# Patient Record
Sex: FEMALE | Race: WHITE | NOT HISPANIC OR LATINO | Employment: OTHER | URBAN - METROPOLITAN AREA
[De-identification: names, ages, dates, MRNs, and addresses within clinical notes are randomized per-mention and may not be internally consistent; named-entity substitution may affect disease eponyms.]

---

## 2017-01-31 ENCOUNTER — TELEPHONE (OUTPATIENT)
Dept: CARDIOTHORACIC SURGERY | Facility: CLINIC | Age: 72
End: 2017-01-31

## 2017-01-31 DIAGNOSIS — R91.1 PULMONARY NODULE: Primary | ICD-10-CM

## 2017-01-31 NOTE — TELEPHONE ENCOUNTER
Received a call from the pt's son requesting a referral to a thoracic surgeon at [facility] for this week. Pt's son had many questions in regards to coming to [facility] on Wednesday or Thursday as the pt will be dc'ed from rehab tomorrow morning. Pt broke her hip and it was surgically fixed at [facility] and then she was sent to rehab for PT/OT. 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 Pt met heme/onc Dr. Palmer after the diagnosis and she did not feel comfortable moving forward with care with Dr. Palmer.   A PET was performed yesterday and was negative for metastatic disease. Discussed the referral process to pt's son and he is agreeable to bring his mom to [facility] for an appt. Explained that we would obtain PFT's prior to consultation with the surgeon. Pt's son insisted the pt be seen in the next two days. Only availability on Thursday 2/2 is at 8a. Pt's son is agreeable to this date/time and will have the pt here for 730a to have PFT's performed. I will email him the confirmed appts once records are received tomorrow. He will go to medical records to have her records faxed to me tomorrow; provided him with my contact information and fax #. He will also  imaging from radiology to ensure he has the CT chest and PET on CD.

## 2017-02-02 ENCOUNTER — HOSPITAL ENCOUNTER (OUTPATIENT)
Dept: PULMONOLOGY | Facility: CLINIC | Age: 72
Discharge: HOME | End: 2017-02-02
Payer: MEDICARE

## 2017-02-02 ENCOUNTER — OFFICE VISIT (OUTPATIENT)
Dept: CARDIOTHORACIC SURGERY | Facility: CLINIC | Age: 72
End: 2017-02-02
Payer: MEDICARE

## 2017-02-02 VITALS
HEIGHT: 63 IN | WEIGHT: 128 LBS | BODY MASS INDEX: 22.68 KG/M2 | OXYGEN SATURATION: 97 % | SYSTOLIC BLOOD PRESSURE: 97 MMHG | DIASTOLIC BLOOD PRESSURE: 67 MMHG | HEART RATE: 95 BPM

## 2017-02-02 DIAGNOSIS — R91.1 PULMONARY NODULE: ICD-10-CM

## 2017-02-02 DIAGNOSIS — J43.2 CENTRILOBULAR EMPHYSEMA: ICD-10-CM

## 2017-02-02 DIAGNOSIS — C34.11 MALIGNANT NEOPLASM OF UPPER LOBE OF RIGHT LUNG: Primary | ICD-10-CM

## 2017-02-02 LAB
POST FEV1 FVC: 0.62
POST FEV1: 1.39
POST FVC: 2.24
PRE FEV1 FVC: 60
PRE FEV1: 1.35
PRE FVC: 2.25
PREDICTED FEV1 FVC: 78
PREDICTED FEV1: 2.1
PREDICTED FVC: 2.71

## 2017-02-02 PROCEDURE — 94727 GAS DIL/WSHOT DETER LNG VOL: CPT | Mod: 26,S$PBB,, | Performed by: INTERNAL MEDICINE

## 2017-02-02 PROCEDURE — 99999 PR PBB SHADOW E&M-EST. PATIENT-LVL III: CPT | Mod: PBBFAC,,, | Performed by: THORACIC SURGERY (CARDIOTHORACIC VASCULAR SURGERY)

## 2017-02-02 PROCEDURE — 94060 EVALUATION OF WHEEZING: CPT | Mod: 26,S$PBB,, | Performed by: INTERNAL MEDICINE

## 2017-02-02 PROCEDURE — 99205 OFFICE O/P NEW HI 60 MIN: CPT | Mod: S$PBB,,, | Performed by: THORACIC SURGERY (CARDIOTHORACIC VASCULAR SURGERY)

## 2017-02-02 PROCEDURE — 99213 OFFICE O/P EST LOW 20 MIN: CPT | Mod: PBBFAC | Performed by: THORACIC SURGERY (CARDIOTHORACIC VASCULAR SURGERY)

## 2017-02-02 PROCEDURE — 94729 DIFFUSING CAPACITY: CPT | Mod: 26,S$PBB,, | Performed by: INTERNAL MEDICINE

## 2017-02-02 RX ORDER — HYDROCODONE BITARTRATE AND ACETAMINOPHEN 5; 325 MG/1; MG/1
1 TABLET ORAL EVERY 6 HOURS PRN
COMMUNITY
End: 2017-03-27 | Stop reason: SDUPTHER

## 2017-02-02 RX ORDER — TRAZODONE HYDROCHLORIDE 50 MG/1
50 TABLET ORAL NIGHTLY
COMMUNITY

## 2017-02-02 RX ORDER — ZOLPIDEM TARTRATE 10 MG/1
TABLET ORAL
Refills: 5 | COMMUNITY
Start: 2016-12-17 | End: 2017-03-02

## 2017-02-02 RX ORDER — ERGOCALCIFEROL 1.25 MG/1
50000 CAPSULE ORAL
COMMUNITY

## 2017-02-02 RX ORDER — TOFACITINIB 5 MG/1
TABLET, FILM COATED ORAL
Refills: 4 | COMMUNITY
Start: 2017-01-26 | End: 2017-03-02

## 2017-02-02 RX ORDER — CALCIUM CARBONATE 600 MG
600 TABLET ORAL 2 TIMES DAILY WITH MEALS
COMMUNITY

## 2017-02-02 NOTE — LETTER
February 2, 2017      Dionna Mcgrath MD  [address]  [place] [address]           [place] - Thoracic Surgery  [address]  [place] [address]  Phone: [phone]  Fax: [phone]          Patient: Christi Ridley   MR Number: [id number]   YOB: 1945   Date of Visit: 2/2/2017       Dear Dr. Dionna Mcgrath:    Thank you for referring Christi Ridley to me for evaluation. Attached you will find relevant portions of my assessment and plan of care.    If you have questions, please do not hesitate to call me. I look forward to following Christi Ridley along with you.    Sincerely,    Cristobal Culp MD    Enclosure  CC:  No Recipients    If you would like to receive this communication electronically, please contact [email or web address] or [phone] to request more information on [email or web address] Link access.    For providers and/or their staff who would like to refer a patient to [facility], please contact us through our one-stop-shop provider referral line, [facility], at [phone].    If you feel you have received this communication in error or would no longer like to receive these types of communications, please e-mail [email or web address]

## 2017-02-03 ENCOUNTER — TELEPHONE (OUTPATIENT)
Dept: CARDIOTHORACIC SURGERY | Facility: CLINIC | Age: 72
End: 2017-02-03

## 2017-02-03 NOTE — TELEPHONE ENCOUNTER
Called and spoke with son Arden concerning the last time a colonoscopy was performed on Mrs. Ridley.  Report no previous colonoscopy has been performed, requesting a colonoscopy be requested with her primary care physician on Tuesday 2/7.  Informed him also that the PET scan showed an abnormality in her colon.  Asked to have results forwarded to Dr. Culp's office.  Confirmed correct fax information to send reports.

## 2017-02-13 ENCOUNTER — TELEPHONE (OUTPATIENT)
Dept: CARDIOTHORACIC SURGERY | Facility: CLINIC | Age: 72
End: 2017-02-13

## 2017-02-13 NOTE — TELEPHONE ENCOUNTER
Received call from son Arden, informed that the colonoscopy has been performed and asked that results be sent to Dr. Culp's office.  Offered fax number again to send records to.

## 2017-02-20 ENCOUNTER — TELEPHONE (OUTPATIENT)
Dept: CARDIOTHORACIC SURGERY | Facility: CLINIC | Age: 72
End: 2017-02-20

## 2017-02-20 NOTE — TELEPHONE ENCOUNTER
----- Message from Cristobal Culp MD sent at 2/17/2017  7:35 PM CST -----  She can come in whenever she wants to reassess and discuss    ----- Message -----     From: Yumiko Yan RN     Sent: 2/17/2017   5:32 PM       To: Cristobal Culp MD    Mrs. Ridley Cards clearance and testing is scanned into media.  Son Arden would like to know where we are with scheduling her for surgery.  I will touch base with her on Monday to see about the rehab portion of her recovery.    Thanks  Yumiko

## 2017-03-02 ENCOUNTER — HOSPITAL ENCOUNTER (OUTPATIENT)
Dept: PREADMISSION TESTING | Facility: HOSPITAL | Age: 72
Discharge: HOME | End: 2017-03-02
Attending: ANESTHESIOLOGY
Payer: MEDICARE

## 2017-03-02 ENCOUNTER — ANESTHESIA EVENT (OUTPATIENT)
Dept: SURGERY | Facility: HOSPITAL | Age: 72
DRG: 167 | End: 2017-03-02
Payer: MEDICARE

## 2017-03-02 ENCOUNTER — OFFICE VISIT (OUTPATIENT)
Dept: CARDIOTHORACIC SURGERY | Facility: CLINIC | Age: 72
End: 2017-03-02
Payer: MEDICARE

## 2017-03-02 VITALS
HEIGHT: 63 IN | TEMPERATURE: 98 F | BODY MASS INDEX: 22.32 KG/M2 | DIASTOLIC BLOOD PRESSURE: 68 MMHG | OXYGEN SATURATION: 95 % | HEART RATE: 85 BPM | SYSTOLIC BLOOD PRESSURE: 109 MMHG | WEIGHT: 126 LBS | RESPIRATION RATE: 16 BRPM

## 2017-03-02 VITALS
BODY MASS INDEX: 21.61 KG/M2 | DIASTOLIC BLOOD PRESSURE: 68 MMHG | SYSTOLIC BLOOD PRESSURE: 105 MMHG | HEART RATE: 81 BPM | WEIGHT: 121.94 LBS | HEIGHT: 63 IN

## 2017-03-02 DIAGNOSIS — M06.9 RHEUMATOID ARTHRITIS, INVOLVING UNSPECIFIED SITE, UNSPECIFIED RHEUMATOID FACTOR PRESENCE: ICD-10-CM

## 2017-03-02 DIAGNOSIS — C34.11 MALIGNANT NEOPLASM OF RIGHT UPPER LOBE OF LUNG: Primary | ICD-10-CM

## 2017-03-02 PROCEDURE — 99214 OFFICE O/P EST MOD 30 MIN: CPT | Mod: S$PBB,,, | Performed by: THORACIC SURGERY (CARDIOTHORACIC VASCULAR SURGERY)

## 2017-03-02 PROCEDURE — 99999 PR PBB SHADOW E&M-EST. PATIENT-LVL IV: CPT | Mod: PBBFAC,,, | Performed by: THORACIC SURGERY (CARDIOTHORACIC VASCULAR SURGERY)

## 2017-03-02 RX ORDER — ALPRAZOLAM 0.5 MG/1
TABLET ORAL
Refills: 3 | COMMUNITY
Start: 2017-02-19

## 2017-03-02 NOTE — IP AVS SNAPSHOT
[facility]  [address]  [facility] [address]  Phone: [phone]           Patient Discharge Instructions    Our goal is to set you up for success. This packet includes information on your condition, medications, and your home care. It will help you to care for yourself so you don't get sicker.     Please ask your nurse if you have any questions.        There are many details to remember when preparing for your surgery. Here is what you will need to do, please ask your nurse if there are more specific instructions and if you have any questions:    1. 24 hours before procedure Do not smoke or drink alcoholic beverages 24 hours prior to your procedure    2. Eating before procedure Do not eat or drink anything 8 hours before your procedure - this includes gum, mints, and candy.     3. Day of procedure Please remove all jewelry for the procedure. If you wear contact lenses, dentures, hearing aids or glasses, bring a container to put them in during your surgery and give to a family member for safekeeping.  If your doctor has scheduled you for an overnight stay, bring a small overnight bag with any personal items that you need.    4. After procedure Make arrangements in advance for transportation home by a responsible adult. It is not safe to drive a vehicle during the 24 hours following surgery.     PLEASE NOTE: You may be contacted the day before your surgery to confirm your surgery date and arrival time. The Surgery schedule has many variables which may affect the time of your surgery case. Family members should be available if your surgery time changes.                [facility]  Unless otherwise directed by your provider, please contact [facility], our nurse care line that is available for 24/7 assistance.     [phone] (toll-free)    Registered nurses in the [facility] provide clinical advisement, health education, appointment booking, and other  advisory services.                    ** Verify the list of medication(s) below is accurate and up to date. Carry this with you in case of emergency. If your medications have changed, please notify your healthcare provider.             Medication List      TAKE these medications        Additional Info                      alprazolam 0.5 MG tablet   Commonly known as:  XANAX   Refills:  3    Instructions:  TK 1 T PO QID PRA     Begin Date    AM    Noon    PM    Bedtime       calcium carbonate 600 mg (1,500 mg) Tab   Commonly known as:  OS-JACKI   Refills:  0   Dose:  600 mg    Instructions:  Take 600 mg by mouth 2 (two) times daily with meals.     Begin Date    AM    Noon    PM    Bedtime       ergocalciferol 50,000 unit Cap   Commonly known as:  ERGOCALCIFEROL   Refills:  0   Dose:  16647 Units    Instructions:  Take 50,000 Units by mouth every 7 days.     Begin Date    AM    Noon    PM    Bedtime       hydrocodone-acetaminophen 5-325mg 5-325 mg per tablet   Commonly known as:  NORCO   Refills:  0   Dose:  1 tablet    Instructions:  Take 1 tablet by mouth every 6 (six) hours as needed for Pain.     Begin Date    AM    Noon    PM    Bedtime       trazodone 50 MG tablet   Commonly known as:  DESYREL   Refills:  0   Dose:  50 mg    Instructions:  Take 50 mg by mouth every evening.     Begin Date    AM    Noon    PM    Bedtime                  Please bring to all follow up appointments:    1. A copy of your discharge instructions.  2. All medicines you are currently taking in their original bottles.  3. Identification and insurance card.    Please arrive 15 minutes ahead of scheduled appointment time.    Please call 24 hours in advance if you must reschedule your appointment and/or time.        Your Scheduled Appointments     Mar 07, 2017 10:30 AM CST   CT CHEST W/OUT CONTRAST with [facility] CT2 LIMIT 500 LBS   [facility] [facility] [address] [place] [address] [phone]             Mar 07, 2017 11:00 AM CST   Spirometry Pre/Post with [facility] PULMONARY   [facility] [address]  [place] [address] [phone]              Your Future Surgeries/Procedures     Mar 08, 2017   Surgery with Cristobal Culp MD   [facility]    [address] [facility] [address] [phone]                  Discharge Instructions       Your surgery has been scheduled for:__________________________________________    You should report to:  ____[facility], located on the [place] side of the first floor of the           [facility] ([phone])  ____[facility], located on the [facility] side of the            Second floor of the [facility] ([phone])  ____3rd Floor [facility] located on the [facility] side of the [facility] [phone]  Please Note   - Tell your doctor if you take Aspirin, products containing Aspirin, herbal medications  or blood thinners, such as Coumadin, Ticlid, or Plavix.  (Consult your provider regarding holding or stopping before surgery).  - Arrange for someone to drive you home following surgery.  You will not be allowed to leave the surgical facility alone or drive yourself home following sedation and anesthesia.  Before Surgery  - Stop taking all herbal medications 14days prior to surgery  - No Motrin/Advil (Ibuprofen) 7 days before surgery  - No Aleve (Naproxen) 7 days before surgery  - Stop Taking Asprin, products containing Asprin _____days before surgery  - Stop taking blood thinners_______days before surgery  - Refrain from drinking alcoholic beverages for 24hours before and after surgery  - Stop or limit smoking _________days before surgery  Night before Surgery  - DO NOT EAT OR DRINK ANYTHING AFTER MIDNIGHT, INCLUDING GUM, HARD CANDY, MINTS, OR CHEWING  TOBACCO.  - Take a shower or bath (shower is recommended).  Bathe with Hibiclens soap or an antibacterial soap from the neck down.  If not supplied by your surgeon, hibiclens soap will need to be purchased over the counter in pharmacy.  Rinse soap off thoroughly.  - Shampoo your hair with your regular shampoo  The Day of Surgery  - Take another bath or shower with hibiclens or any antibacterial soap, to reduce the chance of infection.  - Take heart and blood pressure medications with a small sip of water, as advised by the perioperative team.  - Do not take fluid pills  - You may brush your teeth and rinse your mouth, but do not swall any additional water.   - Do not apply perfumes, powder, body lotions or deodorant on the day of surgery.  - Nail polish should be removed.  - Do not wear makeup or moisturizer  - Wear comfortable clothes, such as a button front shirt and loose fitting pants.  - Leave all jewelry, including body piercings, and valuables at home.    - Bring any devices you will neeed after surgery such as crutches or canes.  - If you have sleep apnea, please bring your CPAP machine  In the event that your physical condition changes including the onset of a cold or respiratory illness, or if you have to delay or cancel your surgery, please notify your surgeon.Anesthesia: General Anesthesia  Youre due to have surgery. During surgery, youll be given medication called anesthesia. (It is also called anesthetic.) This will keep you comfortable and pain-free. Your surgeon will use general anesthesia. This sheet tells you more about it.    What Is General Anesthesia?  General anesthesia puts you into a state like deep sleep. It goes into the bloodstream (IV anesthetics), into the lungs (gas anesthetics), or both. You feel nothing during the procedure. You will not remember it. During the procedure, the anesthesia provider monitors you. He or she checks your heart rate and rhythm, blood pressure, and blood  oxygen.  · IV Anesthetics  IV anesthetics are given through an IV line in your arm. Theyre often given first. This is so you are asleep before a gas anesthetic is started. Some kinds of IV anesthetics relieve pain. Others relax you. Your doctor will decide which kind is best in your case.  · Gas Anesthetics  Gas anesthetics are breathed into the lungs. They are often used to keep you asleep. They can be given through a facemask, an endotracheal tube, or a laryngeal mask airway.  ¨ If you have a facemask, your anesthesia provider will most likely place it over your nose and mouth while youre still awake. Youll breathe oxygen through the mask as your IV anesthetic is started. Gas anesthetic may be added through the mask.  ¨ If you have an endotracheal tube or a laryngeal mask airway, it will be inserted into your throat after youre asleep.  Anesthesia Tools and Medications  You will likely have:  · IV anesthetics sent through an IV line into your bloodstream.  · Gas anesthetics breathed into your lungs, where they pass into your bloodstream.  · A pulse oximeter on the end of your finger. This measures your blood oxygen level.  · Electrocardiography leads (electrodes) on your chest. These record your heart rate and rhythm.  · A blood pressure cuff. This reads your blood pressure.  Risks and Possible Complications  General anesthesia has some risks. These include:  · Breathing problems  · Nausea and vomiting  · Sore throat or hoarseness  · Allergic reaction to the anesthetic  · Ongoing numbness (rare)  · Irregular heartbeat (rare)  · Cardiac arrest (rare)   Anesthesia Safety  · Follow all instructions you are given for how long not to eat or drink before your procedure.  · Be sure your doctor knows what medications you take. This includes over-the-counter medications, herbs, and supplements.  · Have an adult family member or friend drive you home after the procedure.  · For the first 24 hours after your  surgery:  ¨ Do not drive or use heavy equipment.  ¨ Do not make important decisions or sign documents.  ¨ Avoid alcohol.  ¨ Have someone stay with you, if possible. They can watch for problems and help keep you safe.  © 5898-1833 [facility] [facility], [address] [place] [address], [place] [address]. All rights reserved. This information is not intended as a substitute for professional medical care. Always follow your healthcare professional's instructions.    Discharge References/Attachments     PAIN AT HOME, MANAGING POST-OP: NON-MEDICATION RELIEF (ENGLISH)    PAIN MANAGEMENT AFTER SURGERY (ENGLISH)        Admission Information     Date & Time Provider Department CSN    3/2/2017  1:00 PM Rhonda G Leopold, MD [facility] [id number]      Care Providers     Provider Role Specialty Primary office phone    Rhonda G Leopold, MD Attending Provider Anesthesiology [phone]      Your Vitals Were     BP                   109/68           Recent Lab Values     No lab values to display.      Allergies as of 3/2/2017     No Known Allergies      Advance Directives     An advance directive is a document which, in the event you are no longer able to make decisions for yourself, tells your healthcare team what kind of treatment you do or do not want to receive, or who you would like to make those decisions for you.  If you do not currently have an advance directive, [facility] encourages you to create one.  For more information call:  [phone]), [phone] ([phone]),  or log on to [email or web address].        Smoking Cessation     If you would like to quit smoking:   You may be eligible for free services if you are a [place] resident and started smoking cigarettes before September 1, 1988.  Call the Smoking Cessation Trust (SCT) toll free at (655) 965-9517 or (057) 976-9519.   Call 0-800-QUIT-NOW if you do not meet the above criteria.            Language Assistance Services     ATTENTION:  Language assistance services are available, free of charge. Please call [phone].      ATENCIÓN: Si isra escalera, tiene a azul disposición servicios gratuitos de asistencia lingüística. Llame al [phone].     CHÚ Ý: N?u b?n nói Ti?ng Vi?t, có các d?ch v? h? tr? ngôn ng? mi?n phí dành cho b?n. G?i s? [phone].        [facility] Sign-Up     Activating your [facility] account is as easy as 1-2-3!     1) Visit [email or web address], select Sign Up Now, enter this activation code and your date of birth, then select Next.  [email or web address]  Expires: 4/16/2017  1:39 PM      2) Create a username and password to use when you visit [facility] in the future and select a security question in case you lose your password and select Next.    3) Enter your e-mail address and click Sign Up!    Additional Information  If you have questions, please e-mail [email or web address] or call [phone] to talk to our [facility] staff. Remember, [facility] is NOT to be used for urgent needs. For medical emergencies, dial 911.          [facility] complies with applicable Federal civil rights laws and does not discriminate on the basis of race, color, national origin, age, disability, or sex.

## 2017-03-02 NOTE — PROGRESS NOTES
History & Physical    SUBJECTIVE:     History of Present Illness:   71 y.o. female presents with PMH of COPD, HTN, HLD, and RA presents with right apical adenocarcinoma. At last visit, patient was recently admitted at an OSH following a fall that resulting in a right femur fracture fracture. During her evaluation, she was found to have a right apical nodule which was found to be adenocarcinoma following percutaneous biopsy. Returns today after 1 month of outpatient rehab. Today she reports improved mobility with rehab. Uses a walker at home. Reports upper and lower extremity pain and swelling due to being off all RA medications. Denies fever, chills, SOB, wheeze, changes in appetite or bowel/bladder functioning.     Current smoker. Smokes 2-4 cigarettes/week. 50 pack year history.     Chief Complaint   Patient presents with    Follow-up       Review of patient's allergies indicates:  No Known Allergies    Current Outpatient Prescriptions   Medication Sig Dispense Refill    alprazolam (XANAX) 0.5 MG tablet TK 1 T PO QID PRA  3    calcium carbonate (OS-JACKI) 600 mg (1,500 mg) Tab Take 600 mg by mouth 2 (two) times daily with meals.      docusate sodium (COLACE) 50 MG capsule Take by mouth 2 (two) times daily.      ENBREL SURECLICK 50 mg/mL (0.98 mL) PnIj INJECT 50MG SUBCUTANEOUSLY ONCE WEEKLY 3.92 mL 3    ergocalciferol (ERGOCALCIFEROL) 50,000 unit Cap Take 50,000 Units by mouth every 7 days.      hydrocodone-acetaminophen 5-325mg (NORCO) 5-325 mg per tablet Take 1 tablet by mouth every 6 (six) hours as needed for Pain.      trazodone (DESYREL) 50 MG tablet Take 50 mg by mouth every evening.      XELJANZ 5 mg Tab TK 1 T PO BID  4    zolpidem (AMBIEN) 10 mg Tab TK 1 T PO QHS PRF SLEEP  5     No current facility-administered medications for this visit.        No past medical history on file.  No past surgical history on file.  No family history on file.  Social History   Substance Use Topics    Smoking status:  "Not on file    Smokeless tobacco: Not on file    Alcohol use Not on file        Review of Systems:  Review of Systems   Constitutional: Negative for activity change, appetite change, diaphoresis and fatigue.   HENT: Negative.    Eyes: Negative.    Respiratory: Negative for cough, chest tightness, shortness of breath and wheezing.    Cardiovascular: Positive for leg swelling. Negative for chest pain and palpitations.   Endocrine: Negative.    Genitourinary: Negative.    Musculoskeletal: Positive for arthralgias, joint swelling and myalgias.   Skin: Negative.    Allergic/Immunologic: Negative.    Neurological: Positive for weakness. Negative for tremors and syncope.   Psychiatric/Behavioral: Negative.        OBJECTIVE:     Vital Signs (Most Recent)  Pulse: 81 (03/02/17 1052)  BP: 105/68 (03/02/17 1052)  5' 3" (1.6 m)  55.3 kg (121 lb 14.6 oz)     Physical Exam:  Physical Exam   Constitutional: She is oriented to person, place, and time. She appears well-developed and well-nourished. No distress.   HENT:   Head: Normocephalic and atraumatic.   Mouth/Throat: Oropharynx is clear and moist.   Eyes: EOM are normal. Pupils are equal, round, and reactive to light.   Neck: Normal range of motion. Neck supple.   Cardiovascular: Normal rate, regular rhythm and normal heart sounds.  Exam reveals decreased pulses.    Pulses:       Radial pulses are 2+ on the right side, and 2+ on the left side.        Dorsalis pedis pulses are 1+ on the right side, and 1+ on the left side.        Posterior tibial pulses are 1+ on the right side, and 1+ on the left side.   Pulmonary/Chest: Effort normal. No respiratory distress. She has decreased breath sounds in the right lower field and the left lower field. She has no wheezes. She has no rhonchi.   Abdominal: Soft. Bowel sounds are normal. She exhibits no distension. There is no tenderness.   Musculoskeletal: Normal range of motion.        Right wrist: She exhibits tenderness and swelling.    "     Right ankle: She exhibits swelling.        Left ankle: She exhibits swelling.   Lymphadenopathy:     She has no cervical adenopathy.   Neurological: She is alert and oriented to person, place, and time.   Skin: Skin is warm and dry. She is not diaphoretic. No erythema. No pallor.   Psychiatric: She has a normal mood and affect.   Vitals reviewed.    Diagnostic Results:    PFTS:   FEV1- 1.35L 64%  DLCO- 9 52%    Outside Chest CT, PET and Stress ECHO: Reviewed.     ASSESSMENT/PLAN:     71 y.o. female presents with PMH of COPD, HTN, HLD, and RA presents with right apical adenocarcinoma.    PLAN:Plan     - Repeat chest CT and PFTS prior to surgery   - Proceed with right VATS for right upper lobectomy and MLND, possible thoracotomy   Appropriate patient education regarding the bran-operative period as well as intraperative details were discussed. Risks, including but not limited to, bleeding, infection, pain and anesthetic complication were discussed. Patient was given the opportunity to ask questions and to have those questions answered to their satisfaction. Patient verbalized understanding to both procedure and associated risks. Consent was obtained.    ATTENDING ATTESTATION:    I evaluated the patient and I agree with the assessment and plan  Doing well.  Will repeat chest CT and PFTs prior to resection, which is tentatively planned for 3/22/2017.  Long discussion with patient and her  about the indication and rationale for right upper lobectomy, the details of the operation, as well as its risks, benefits and potential outcomes.  We also discussed the alternative treatments, including SBRT, and the risks of no treatment.  They were given the opportunity to ask questions and I answered all of their questions to their satisfaction.  They demonstrated understanding and appreciation of above info.  She has decided to proceed with Right Thoracoscopic Upper Lobectomy and Mediastinal Lymph Node Dissection,  Possible Thoracotomy on 3/22/2017.

## 2017-03-02 NOTE — DISCHARGE INSTRUCTIONS
Your surgery has been scheduled for:__________________________________________    You should report to:  ____[facility], located on the [place] side of the first floor of the           [facility] ([phone])  ____[facility], located on the [facility] side of the            Second floor of the [facility] ([phone])  ____3rd Floor [facility] located on the [facility] side of the [facility] [phone]  Please Note   - Tell your doctor if you take Aspirin, products containing Aspirin, herbal medications  or blood thinners, such as Coumadin, Ticlid, or Plavix.  (Consult your provider regarding holding or stopping before surgery).  - Arrange for someone to drive you home following surgery.  You will not be allowed to leave the surgical facility alone or drive yourself home following sedation and anesthesia.  Before Surgery  - Stop taking all herbal medications 14days prior to surgery  - No Motrin/Advil (Ibuprofen) 7 days before surgery  - No Aleve (Naproxen) 7 days before surgery  - Stop Taking Asprin, products containing Asprin _____days before surgery  - Stop taking blood thinners_______days before surgery  - Refrain from drinking alcoholic beverages for 24hours before and after surgery  - Stop or limit smoking _________days before surgery  Night before Surgery  - DO NOT EAT OR DRINK ANYTHING AFTER MIDNIGHT, INCLUDING GUM, HARD CANDY, MINTS, OR CHEWING TOBACCO.  - Take a shower or bath (shower is recommended).  Bathe with Hibiclens soap or an antibacterial soap from the neck down.  If not supplied by your surgeon, hibiclens soap will need to be purchased over the counter in pharmacy.  Rinse soap off thoroughly.  - Shampoo your hair with your regular shampoo  The Day of Surgery  - Take another bath or shower with hibiclens or any antibacterial soap, to reduce the chance of infection.  - Take heart and blood  pressure medications with a small sip of water, as advised by the perioperative team.  - Do not take fluid pills  - You may brush your teeth and rinse your mouth, but do not swall any additional water.   - Do not apply perfumes, powder, body lotions or deodorant on the day of surgery.  - Nail polish should be removed.  - Do not wear makeup or moisturizer  - Wear comfortable clothes, such as a button front shirt and loose fitting pants.  - Leave all jewelry, including body piercings, and valuables at home.    - Bring any devices you will neeed after surgery such as crutches or canes.  - If you have sleep apnea, please bring your CPAP machine  In the event that your physical condition changes including the onset of a cold or respiratory illness, or if you have to delay or cancel your surgery, please notify your surgeon.Anesthesia: General Anesthesia  Youre due to have surgery. During surgery, youll be given medication called anesthesia. (It is also called anesthetic.) This will keep you comfortable and pain-free. Your surgeon will use general anesthesia. This sheet tells you more about it.    What Is General Anesthesia?  General anesthesia puts you into a state like deep sleep. It goes into the bloodstream (IV anesthetics), into the lungs (gas anesthetics), or both. You feel nothing during the procedure. You will not remember it. During the procedure, the anesthesia provider monitors you. He or she checks your heart rate and rhythm, blood pressure, and blood oxygen.  · IV Anesthetics  IV anesthetics are given through an IV line in your arm. Theyre often given first. This is so you are asleep before a gas anesthetic is started. Some kinds of IV anesthetics relieve pain. Others relax you. Your doctor will decide which kind is best in your case.  · Gas Anesthetics  Gas anesthetics are breathed into the lungs. They are often used to keep you asleep. They can be given through a facemask, an endotracheal tube, or a  laryngeal mask airway.  ¨ If you have a facemask, your anesthesia provider will most likely place it over your nose and mouth while youre still awake. Youll breathe oxygen through the mask as your IV anesthetic is started. Gas anesthetic may be added through the mask.  ¨ If you have an endotracheal tube or a laryngeal mask airway, it will be inserted into your throat after youre asleep.  Anesthesia Tools and Medications  You will likely have:  · IV anesthetics sent through an IV line into your bloodstream.  · Gas anesthetics breathed into your lungs, where they pass into your bloodstream.  · A pulse oximeter on the end of your finger. This measures your blood oxygen level.  · Electrocardiography leads (electrodes) on your chest. These record your heart rate and rhythm.  · A blood pressure cuff. This reads your blood pressure.  Risks and Possible Complications  General anesthesia has some risks. These include:  · Breathing problems  · Nausea and vomiting  · Sore throat or hoarseness  · Allergic reaction to the anesthetic  · Ongoing numbness (rare)  · Irregular heartbeat (rare)  · Cardiac arrest (rare)   Anesthesia Safety  · Follow all instructions you are given for how long not to eat or drink before your procedure.  · Be sure your doctor knows what medications you take. This includes over-the-counter medications, herbs, and supplements.  · Have an adult family member or friend drive you home after the procedure.  · For the first 24 hours after your surgery:  ¨ Do not drive or use heavy equipment.  ¨ Do not make important decisions or sign documents.  ¨ Avoid alcohol.  ¨ Have someone stay with you, if possible. They can watch for problems and help keep you safe.  © 9106-3846 [employer], [address] [place] [address], [place] [address]. All rights reserved. This information is not intended as a substitute for professional medical care. Always follow your healthcare professional's instructions.

## 2017-03-02 NOTE — ANESTHESIA PREPROCEDURE EVALUATION
03/02/2017  Pre-operative evaluation for Procedure(s) (LRB):  THORACOSCOPY-VIDEO ASSISTED (VATS) (Right)  DISSECTION-LYMPH NODE (Right)    Christi Ridley is a 71 y.o. female with PMH of COPD, HTN, HLD, tobacco abuse (50 pack year history; has weaned herself down to 0-1 cigarette per day) and RA presents to preop clinic for evaluation prior to above procedure. She is currently wheel-chair bound due to recovery from right femur fracture in January. She has improved mobility with rehab. She denies any history of CAD and denies any active chest pain or SOB today. She received preop EKG and ECHO on 2/10 with Cardiology and was cleared for surgery. Last H/H on 1/31/17 8.8/26.4 down from 3/29/12 H/H of 16/47.8. Ordered CBC, BMP, and Type & Screen.   Lab Results   Component Value Date    WBC 5.34 03/02/2017    HGB 9.6 (L) 03/02/2017    HCT 29.8 (L) 03/02/2017    MCV 99 (H) 03/02/2017     (H) 03/02/2017       Chemistry        Component Value Date/Time     (L) 03/02/2017 1410    K 3.7 03/02/2017 1410     03/02/2017 1410    CO2 24 03/02/2017 1410    BUN 14 03/02/2017 1410    CREATININE 0.8 03/02/2017 1410     (H) 03/02/2017 1410        Component Value Date/Time    CALCIUM 9.0 03/02/2017 1410    ALKPHOS 113 03/29/2012 1040    AST 54 (H) 03/29/2012 1040    ALT 38 03/29/2012 1040    BILITOT 0.7 03/29/2012 1040            Prev airway: None on file      Patient Active Problem List   Diagnosis    Malignant neoplasm of upper lobe of right lung    Centrilobular emphysema    Rheumatoid arthritis       Review of patient's allergies indicates:  No Known Allergies     Current Outpatient Prescriptions on File Prior to Encounter   Medication Sig Dispense Refill    alprazolam (XANAX) 0.5 MG tablet TK 1 T PO QID PRA  3    calcium carbonate (OS-JACKI) 600 mg (1,500 mg) Tab Take 600 mg by mouth 2 (two)  times daily with meals.      ergocalciferol (ERGOCALCIFEROL) 50,000 unit Cap Take 50,000 Units by mouth every 7 days.      hydrocodone-acetaminophen 5-325mg (NORCO) 5-325 mg per tablet Take 1 tablet by mouth every 6 (six) hours as needed for Pain.      trazodone (DESYREL) 50 MG tablet Take 50 mg by mouth every evening.       No current facility-administered medications on file prior to encounter.        No past surgical history on file.    Social History     Social History    Marital status:      Spouse name: N/A    Number of children: N/A    Years of education: N/A     Occupational History    Not on file.     Social History Main Topics    Smoking status: Not on file    Smokeless tobacco: Not on file    Alcohol use Not on file    Drug use: Not on file    Sexual activity: Not on file     Other Topics Concern    Not on file     Social History Narrative         Vital Signs Range (Last 24H):  Temp:  [36.6 °C (97.9 °F)]   Pulse:  [81-85]   Resp:  [16]   BP: (105-109)/(68)   SpO2:  [95 %]       CBC: No results for input(s): WBC, RBC, HGB, HCT, PLT, MCV, MCH, MCHC in the last 72 hours.    CMP: No results for input(s): NA, K, CL, CO2, BUN, CREATININE, GLU, MG, PHOS, CALCIUM, ALBUMIN, PROT, ALKPHOS, ALT, AST, BILITOT in the last 72 hours.    INR  No results for input(s): INR, PROTIME, APTT in the last 72 hours.    Invalid input(s): PT        Diagnostic Studies:  CT Chest: Pending    EKG 2/1/17:  NSR at 81 bpm      2D Echo 2/10/17:  1. Preserved left ventricular systolic function with EF of 60% with regional wall motion abnormality and decreased left ventricular compliance.   2. Mild MR with normal left atrium size  3. Mild TR with right ventricular enlargement    PFTS:   FEV1- 1.35L 64%  DLCO- 9 52%      [facility] Anesthesia Evaluation    I have reviewed the Patient Summary Reports.     I have reviewed the Medications.     Review of Systems  Anesthesia Hx:  No problems with previous Anesthesia  History of prior  surgery of interest to airway management or planning: Denies Family Hx of Anesthesia complications.   Denies Personal Hx of Anesthesia complications.   Social:  Smoker    Hematology/Oncology:         -- Anemia: Current/Recent Cancer.   EENT/Dental:EENT/Dental Normal   Cardiovascular:  Cardiovascular Normal     Pulmonary:   COPD    Renal/:  Renal/ Normal     Hepatic/GI:  Hepatic/GI Normal    Musculoskeletal:  Musculoskeletal Normal    Neurological:  Neurology Normal    Endocrine:  Endocrine Normal    Psych:  Psychiatric Normal           Physical Exam  General:  Well nourished    Airway/Jaw/Neck:  Airway Findings: Mouth Opening: Normal Tongue: Normal  General Airway Assessment: Adult  Mallampati: III  Improves to II with phonation.  TM Distance: Normal, at least 6 cm  Jaw/Neck Findings:  Neck ROM: Normal ROM     Eyes/Ears/Nose:  EYES/EARS/NOSE FINDINGS: Normal   Dental:  Dental Findings: Periodontal disease, Mild, lower partial dentures    Chest/Lungs:  Chest/Lungs Clear    Heart/Vascular:  Heart Findings: Normal Heart murmur: negative       Mental Status:  Mental Status Findings: Normal        Anesthesia Plan  Type of Anesthesia, risks & benefits discussed:  Anesthesia Type:  general  Patient's Preference:   Intra-op Monitoring Plan: arterial line and standard ASA monitors  Intra-op Monitoring Plan Comments:   Post Op Pain Control Plan:   Post Op Pain Control Plan Comments:   Induction:   IV  Beta Blocker:  Patient is not currently on a Beta-Blocker (No further documentation required).       Informed Consent: Patient understands risks and agrees with Anesthesia plan.  Questions answered. Anesthesia consent signed with patient.  ASA Score: 3     Day of Surgery Review of History & Physical: I have interviewed and examined the patient. I have reviewed the patient's H&P dated:    H&P update referred to the surgeon.         Ready For Surgery From Anesthesia Perspective.

## 2017-03-07 ENCOUNTER — TELEPHONE (OUTPATIENT)
Dept: CARDIOTHORACIC SURGERY | Facility: CLINIC | Age: 72
End: 2017-03-07

## 2017-03-07 ENCOUNTER — HOSPITAL ENCOUNTER (OUTPATIENT)
Dept: RADIOLOGY | Facility: HOSPITAL | Age: 72
Discharge: HOME | End: 2017-03-07
Attending: THORACIC SURGERY (CARDIOTHORACIC VASCULAR SURGERY)
Payer: MEDICARE

## 2017-03-07 ENCOUNTER — HOSPITAL ENCOUNTER (OUTPATIENT)
Dept: RESPIRATORY THERAPY | Facility: HOSPITAL | Age: 72
Discharge: HOME | End: 2017-03-07
Attending: THORACIC SURGERY (CARDIOTHORACIC VASCULAR SURGERY)
Payer: MEDICARE

## 2017-03-07 DIAGNOSIS — C34.11 MALIGNANT NEOPLASM OF RIGHT UPPER LOBE OF LUNG: ICD-10-CM

## 2017-03-07 PROCEDURE — 94729 DIFFUSING CAPACITY: CPT

## 2017-03-07 PROCEDURE — 94060 EVALUATION OF WHEEZING: CPT

## 2017-03-07 PROCEDURE — 71250 CT THORAX DX C-: CPT | Mod: 26,,, | Performed by: RADIOLOGY

## 2017-03-07 PROCEDURE — 94727 GAS DIL/WSHOT DETER LNG VOL: CPT

## 2017-03-07 PROCEDURE — 71250 CT THORAX DX C-: CPT | Mod: TC

## 2017-03-07 NOTE — TELEPHONE ENCOUNTER
Called and spoke with phoenix Her ([phone]) to confirm arrival time of 0730 for surgery with Dr. Culp on 3/8.  Reviewed pre-op instructions, NPO after MN, bathing with antibacterial soap, and reporting to 2nd floor [facility] for surgery.  Verbalized understanding.

## 2017-03-08 ENCOUNTER — ANESTHESIA (OUTPATIENT)
Dept: SURGERY | Facility: HOSPITAL | Age: 72
DRG: 167 | End: 2017-03-08
Payer: MEDICARE

## 2017-03-08 ENCOUNTER — HOSPITAL ENCOUNTER (INPATIENT)
Facility: HOSPITAL | Age: 72
LOS: 3 days | Discharge: HOME | DRG: 167 | End: 2017-03-11
Attending: THORACIC SURGERY (CARDIOTHORACIC VASCULAR SURGERY) | Admitting: THORACIC SURGERY (CARDIOTHORACIC VASCULAR SURGERY)
Payer: MEDICARE

## 2017-03-08 DIAGNOSIS — C34.90 ADENOCARCINOMA, LUNG: ICD-10-CM

## 2017-03-08 DIAGNOSIS — C34.11 MALIGNANT NEOPLASM OF UPPER LOBE OF RIGHT LUNG: Primary | ICD-10-CM

## 2017-03-08 DIAGNOSIS — J43.2 CENTRILOBULAR EMPHYSEMA: ICD-10-CM

## 2017-03-08 LAB
ANION GAP SERPL CALC-SCNC: 6 MMOL/L
ANION GAP SERPL CALC-SCNC: 6 MMOL/L
BASOPHILS # BLD AUTO: 0.01 K/UL
BASOPHILS NFR BLD: 0.2 %
BUN SERPL-MCNC: 11 MG/DL
BUN SERPL-MCNC: 11 MG/DL
CALCIUM SERPL-MCNC: 7.8 MG/DL
CALCIUM SERPL-MCNC: 7.8 MG/DL
CHLORIDE SERPL-SCNC: 104 MMOL/L
CHLORIDE SERPL-SCNC: 104 MMOL/L
CO2 SERPL-SCNC: 24 MMOL/L
CO2 SERPL-SCNC: 24 MMOL/L
CREAT SERPL-MCNC: 0.7 MG/DL
CREAT SERPL-MCNC: 0.7 MG/DL
DIFFERENTIAL METHOD: ABNORMAL
EOSINOPHIL # BLD AUTO: 0 K/UL
EOSINOPHIL NFR BLD: 0.2 %
ERYTHROCYTE [DISTWIDTH] IN BLOOD BY AUTOMATED COUNT: 14.6 %
EST. GFR  (AFRICAN AMERICAN): >60 ML/MIN/1.73 M^2
EST. GFR  (AFRICAN AMERICAN): >60 ML/MIN/1.73 M^2
EST. GFR  (NON AFRICAN AMERICAN): >60 ML/MIN/1.73 M^2
EST. GFR  (NON AFRICAN AMERICAN): >60 ML/MIN/1.73 M^2
GLUCOSE SERPL-MCNC: 116 MG/DL
GLUCOSE SERPL-MCNC: 116 MG/DL
GLUCOSE SERPL-MCNC: 118 MG/DL (ref 70–110)
HCO3 UR-SCNC: 23.8 MMOL/L (ref 24–28)
HCT VFR BLD AUTO: 24.4 %
HCT VFR BLD CALC: 22 %PCV (ref 36–54)
HGB BLD-MCNC: 7.9 G/DL
LYMPHOCYTES # BLD AUTO: 0.7 K/UL
LYMPHOCYTES NFR BLD: 12.9 %
MAGNESIUM SERPL-MCNC: 1.2 MG/DL
MCH RBC QN AUTO: 31.1 PG
MCHC RBC AUTO-ENTMCNC: 32.4 %
MCV RBC AUTO: 96 FL
MONOCYTES # BLD AUTO: 0.5 K/UL
MONOCYTES NFR BLD: 8.5 %
NEUTROPHILS # BLD AUTO: 4.3 K/UL
NEUTROPHILS NFR BLD: 78 %
PCO2 BLDA: 46.8 MMHG (ref 35–45)
PH SMN: 7.31 [PH] (ref 7.35–7.45)
PHOSPHATE SERPL-MCNC: 3.9 MG/DL
PLATELET # BLD AUTO: 384 K/UL
PMV BLD AUTO: 9 FL
PO2 BLDA: 84 MMHG (ref 80–100)
POC BE: -2 MMOL/L
POC IONIZED CALCIUM: 1.17 MMOL/L (ref 1.06–1.42)
POC SATURATED O2: 95 % (ref 95–100)
POC TCO2: 25 MMOL/L (ref 23–27)
POTASSIUM BLD-SCNC: 3.3 MMOL/L (ref 3.5–5.1)
POTASSIUM SERPL-SCNC: 3.9 MMOL/L
POTASSIUM SERPL-SCNC: 3.9 MMOL/L
RBC # BLD AUTO: 2.54 M/UL
SAMPLE: ABNORMAL
SODIUM BLD-SCNC: 136 MMOL/L (ref 136–145)
SODIUM SERPL-SCNC: 134 MMOL/L
SODIUM SERPL-SCNC: 134 MMOL/L
WBC # BLD AUTO: 5.56 K/UL

## 2017-03-08 PROCEDURE — 25000003 PHARM REV CODE 250: Performed by: STUDENT IN AN ORGANIZED HEALTH CARE EDUCATION/TRAINING PROGRAM

## 2017-03-08 PROCEDURE — 27000221 HC OXYGEN, UP TO 24 HOURS

## 2017-03-08 PROCEDURE — 27201423 OPTIME MED/SURG SUP & DEVICES STERILE SUPPLY: Performed by: THORACIC SURGERY (CARDIOTHORACIC VASCULAR SURGERY)

## 2017-03-08 PROCEDURE — 32674 THORACOSCOPY LYMPH NODE EXC: CPT | Mod: GC,,, | Performed by: THORACIC SURGERY (CARDIOTHORACIC VASCULAR SURGERY)

## 2017-03-08 PROCEDURE — 32669 THORACOSCOPY REMOVE SEGMENT: CPT | Mod: GC,,, | Performed by: THORACIC SURGERY (CARDIOTHORACIC VASCULAR SURGERY)

## 2017-03-08 PROCEDURE — 88331 PATH CONSLTJ SURG 1 BLK 1SPC: CPT | Performed by: PATHOLOGY

## 2017-03-08 PROCEDURE — 88112 CYTOPATH CELL ENHANCE TECH: CPT | Mod: 26,,, | Performed by: PATHOLOGY

## 2017-03-08 PROCEDURE — 63600175 PHARM REV CODE 636 W HCPCS: Performed by: STUDENT IN AN ORGANIZED HEALTH CARE EDUCATION/TRAINING PROGRAM

## 2017-03-08 PROCEDURE — 37000008 HC ANESTHESIA 1ST 15 MINUTES: Performed by: THORACIC SURGERY (CARDIOTHORACIC VASCULAR SURGERY)

## 2017-03-08 PROCEDURE — 25000003 PHARM REV CODE 250: Performed by: PHYSICIAN ASSISTANT

## 2017-03-08 PROCEDURE — 36000711: Performed by: THORACIC SURGERY (CARDIOTHORACIC VASCULAR SURGERY)

## 2017-03-08 PROCEDURE — 88342 IMHCHEM/IMCYTCHM 1ST ANTB: CPT | Mod: 26,,, | Performed by: PATHOLOGY

## 2017-03-08 PROCEDURE — 63600175 PHARM REV CODE 636 W HCPCS: Performed by: SURGERY

## 2017-03-08 PROCEDURE — 88305 TISSUE EXAM BY PATHOLOGIST: CPT | Performed by: PATHOLOGY

## 2017-03-08 PROCEDURE — C1729 CATH, DRAINAGE: HCPCS | Performed by: THORACIC SURGERY (CARDIOTHORACIC VASCULAR SURGERY)

## 2017-03-08 PROCEDURE — D9220A PRA ANESTHESIA: Mod: ,,, | Performed by: ANESTHESIOLOGY

## 2017-03-08 PROCEDURE — 07B74ZX EXCISION OF THORAX LYMPHATIC, PERCUTANEOUS ENDOSCOPIC APPROACH, DIAGNOSTIC: ICD-10-PCS | Performed by: THORACIC SURGERY (CARDIOTHORACIC VASCULAR SURGERY)

## 2017-03-08 PROCEDURE — 71000033 HC RECOVERY, INTIAL HOUR: Performed by: THORACIC SURGERY (CARDIOTHORACIC VASCULAR SURGERY)

## 2017-03-08 PROCEDURE — 71000039 HC RECOVERY, EACH ADD'L HOUR: Performed by: THORACIC SURGERY (CARDIOTHORACIC VASCULAR SURGERY)

## 2017-03-08 PROCEDURE — 25000003 PHARM REV CODE 250: Performed by: SURGERY

## 2017-03-08 PROCEDURE — 86920 COMPATIBILITY TEST SPIN: CPT

## 2017-03-08 PROCEDURE — 37000009 HC ANESTHESIA EA ADD 15 MINS: Performed by: THORACIC SURGERY (CARDIOTHORACIC VASCULAR SURGERY)

## 2017-03-08 PROCEDURE — 85025 COMPLETE CBC W/AUTO DIFF WBC: CPT

## 2017-03-08 PROCEDURE — 94760 N-INVAS EAR/PLS OXIMETRY 1: CPT

## 2017-03-08 PROCEDURE — 25000003 PHARM REV CODE 250: Performed by: THORACIC SURGERY (CARDIOTHORACIC VASCULAR SURGERY)

## 2017-03-08 PROCEDURE — 0BBC4ZX EXCISION OF RIGHT UPPER LUNG LOBE, PERCUTANEOUS ENDOSCOPIC APPROACH, DIAGNOSTIC: ICD-10-PCS | Performed by: THORACIC SURGERY (CARDIOTHORACIC VASCULAR SURGERY)

## 2017-03-08 PROCEDURE — 36000710: Performed by: THORACIC SURGERY (CARDIOTHORACIC VASCULAR SURGERY)

## 2017-03-08 PROCEDURE — 80048 BASIC METABOLIC PNL TOTAL CA: CPT

## 2017-03-08 PROCEDURE — 36620 INSERTION CATHETER ARTERY: CPT | Mod: 59,,, | Performed by: ANESTHESIOLOGY

## 2017-03-08 PROCEDURE — 27201037 HC PRESSURE MONITORING SET UP

## 2017-03-08 PROCEDURE — 27800903 OPTIME MED/SURG SUP & DEVICES OTHER IMPLANTS: Performed by: THORACIC SURGERY (CARDIOTHORACIC VASCULAR SURGERY)

## 2017-03-08 PROCEDURE — 20600001 HC STEP DOWN PRIVATE ROOM

## 2017-03-08 PROCEDURE — 83735 ASSAY OF MAGNESIUM: CPT

## 2017-03-08 PROCEDURE — 88307 TISSUE EXAM BY PATHOLOGIST: CPT | Mod: 26,,, | Performed by: PATHOLOGY

## 2017-03-08 PROCEDURE — 88305 TISSUE EXAM BY PATHOLOGIST: CPT | Mod: 26,,, | Performed by: PATHOLOGY

## 2017-03-08 PROCEDURE — 84100 ASSAY OF PHOSPHORUS: CPT

## 2017-03-08 PROCEDURE — 88341 IMHCHEM/IMCYTCHM EA ADD ANTB: CPT | Mod: 26,,, | Performed by: PATHOLOGY

## 2017-03-08 DEVICE — SEAMGUARD ESCHELON 60 MM.: Type: IMPLANTABLE DEVICE | Site: LUNG | Status: FUNCTIONAL

## 2017-03-08 RX ORDER — HYDROMORPHONE HYDROCHLORIDE 1 MG/ML
0.2 INJECTION, SOLUTION INTRAMUSCULAR; INTRAVENOUS; SUBCUTANEOUS EVERY 5 MIN PRN
Status: DISCONTINUED | OUTPATIENT
Start: 2017-03-08 | End: 2017-03-08 | Stop reason: HOSPADM

## 2017-03-08 RX ORDER — PANTOPRAZOLE SODIUM 40 MG/1
40 TABLET, DELAYED RELEASE ORAL
Status: DISCONTINUED | OUTPATIENT
Start: 2017-03-09 | End: 2017-03-11 | Stop reason: HOSPADM

## 2017-03-08 RX ORDER — PROPOFOL 10 MG/ML
VIAL (ML) INTRAVENOUS
Status: DISCONTINUED | OUTPATIENT
Start: 2017-03-08 | End: 2017-03-08

## 2017-03-08 RX ORDER — SODIUM CHLORIDE 9 MG/ML
INJECTION, SOLUTION INTRAVENOUS CONTINUOUS
Status: DISCONTINUED | OUTPATIENT
Start: 2017-03-08 | End: 2017-03-09

## 2017-03-08 RX ORDER — HYDROCODONE BITARTRATE AND ACETAMINOPHEN 5; 325 MG/1; MG/1
1 TABLET ORAL EVERY 4 HOURS PRN
Status: DISCONTINUED | OUTPATIENT
Start: 2017-03-08 | End: 2017-03-11 | Stop reason: HOSPADM

## 2017-03-08 RX ORDER — NEOSTIGMINE METHYLSULFATE 1 MG/ML
INJECTION, SOLUTION INTRAVENOUS
Status: DISCONTINUED | OUTPATIENT
Start: 2017-03-08 | End: 2017-03-08

## 2017-03-08 RX ORDER — KETAMINE HYDROCHLORIDE 100 MG/ML
INJECTION, SOLUTION INTRAMUSCULAR; INTRAVENOUS
Status: DISCONTINUED | OUTPATIENT
Start: 2017-03-08 | End: 2017-03-08

## 2017-03-08 RX ORDER — SODIUM CHLORIDE 9 MG/ML
INJECTION, SOLUTION INTRAVENOUS CONTINUOUS
Status: DISCONTINUED | OUTPATIENT
Start: 2017-03-08 | End: 2017-03-08

## 2017-03-08 RX ORDER — ONDANSETRON 2 MG/ML
4 INJECTION INTRAMUSCULAR; INTRAVENOUS DAILY PRN
Status: DISCONTINUED | OUTPATIENT
Start: 2017-03-08 | End: 2017-03-08 | Stop reason: HOSPADM

## 2017-03-08 RX ORDER — LIDOCAINE HCL/PF 100 MG/5ML
SYRINGE (ML) INTRAVENOUS
Status: DISCONTINUED | OUTPATIENT
Start: 2017-03-08 | End: 2017-03-08

## 2017-03-08 RX ORDER — AMOXICILLIN 250 MG
1 CAPSULE ORAL 2 TIMES DAILY
Status: DISCONTINUED | OUTPATIENT
Start: 2017-03-08 | End: 2017-03-11 | Stop reason: HOSPADM

## 2017-03-08 RX ORDER — ENOXAPARIN SODIUM 100 MG/ML
40 INJECTION SUBCUTANEOUS
Status: DISCONTINUED | OUTPATIENT
Start: 2017-03-09 | End: 2017-03-11 | Stop reason: HOSPADM

## 2017-03-08 RX ORDER — BACITRACIN 50000 [IU]/1
INJECTION, POWDER, FOR SOLUTION INTRAMUSCULAR
Status: DISCONTINUED | OUTPATIENT
Start: 2017-03-08 | End: 2017-03-08 | Stop reason: HOSPADM

## 2017-03-08 RX ORDER — ACETAMINOPHEN 10 MG/ML
INJECTION, SOLUTION INTRAVENOUS
Status: DISCONTINUED | OUTPATIENT
Start: 2017-03-08 | End: 2017-03-08

## 2017-03-08 RX ORDER — ROCURONIUM BROMIDE 10 MG/ML
INJECTION, SOLUTION INTRAVENOUS
Status: DISCONTINUED | OUTPATIENT
Start: 2017-03-08 | End: 2017-03-08

## 2017-03-08 RX ORDER — GLYCOPYRROLATE 0.2 MG/ML
INJECTION INTRAMUSCULAR; INTRAVENOUS
Status: DISCONTINUED | OUTPATIENT
Start: 2017-03-08 | End: 2017-03-08

## 2017-03-08 RX ORDER — POLYETHYLENE GLYCOL 3350 17 G/17G
17 POWDER, FOR SOLUTION ORAL DAILY
Status: DISCONTINUED | OUTPATIENT
Start: 2017-03-08 | End: 2017-03-11 | Stop reason: HOSPADM

## 2017-03-08 RX ORDER — MUPIROCIN 20 MG/G
1 OINTMENT TOPICAL 2 TIMES DAILY
Status: DISCONTINUED | OUTPATIENT
Start: 2017-03-08 | End: 2017-03-11 | Stop reason: HOSPADM

## 2017-03-08 RX ORDER — MIDAZOLAM HYDROCHLORIDE 1 MG/ML
INJECTION, SOLUTION INTRAMUSCULAR; INTRAVENOUS
Status: DISCONTINUED | OUTPATIENT
Start: 2017-03-08 | End: 2017-03-08

## 2017-03-08 RX ORDER — HYDROMORPHONE HYDROCHLORIDE 1 MG/ML
1 INJECTION, SOLUTION INTRAMUSCULAR; INTRAVENOUS; SUBCUTANEOUS EVERY 4 HOURS PRN
Status: DISCONTINUED | OUTPATIENT
Start: 2017-03-08 | End: 2017-03-11 | Stop reason: HOSPADM

## 2017-03-08 RX ORDER — TRAZODONE HYDROCHLORIDE 50 MG/1
50 TABLET ORAL NIGHTLY
Status: DISCONTINUED | OUTPATIENT
Start: 2017-03-08 | End: 2017-03-11 | Stop reason: HOSPADM

## 2017-03-08 RX ORDER — KETAMINE HCL IN 0.9 % NACL 50 MG/5 ML
SYRINGE (ML) INTRAVENOUS
Status: DISCONTINUED | OUTPATIENT
Start: 2017-03-08 | End: 2017-03-08

## 2017-03-08 RX ORDER — PHENYLEPHRINE HYDROCHLORIDE 10 MG/ML
INJECTION INTRAVENOUS
Status: DISCONTINUED | OUTPATIENT
Start: 2017-03-08 | End: 2017-03-08

## 2017-03-08 RX ORDER — FENTANYL CITRATE 50 UG/ML
INJECTION, SOLUTION INTRAMUSCULAR; INTRAVENOUS
Status: DISCONTINUED | OUTPATIENT
Start: 2017-03-08 | End: 2017-03-08

## 2017-03-08 RX ORDER — METOCLOPRAMIDE HYDROCHLORIDE 5 MG/ML
5 INJECTION INTRAMUSCULAR; INTRAVENOUS EVERY 6 HOURS PRN
Status: DISCONTINUED | OUTPATIENT
Start: 2017-03-08 | End: 2017-03-11 | Stop reason: HOSPADM

## 2017-03-08 RX ORDER — SODIUM CHLORIDE 0.9 % (FLUSH) 0.9 %
3 SYRINGE (ML) INJECTION
Status: DISCONTINUED | OUTPATIENT
Start: 2017-03-08 | End: 2017-03-11 | Stop reason: HOSPADM

## 2017-03-08 RX ORDER — ONDANSETRON 8 MG/1
8 TABLET, ORALLY DISINTEGRATING ORAL EVERY 8 HOURS PRN
Status: DISCONTINUED | OUTPATIENT
Start: 2017-03-08 | End: 2017-03-11 | Stop reason: HOSPADM

## 2017-03-08 RX ORDER — LIDOCAINE HYDROCHLORIDE 10 MG/ML
1 INJECTION, SOLUTION EPIDURAL; INFILTRATION; INTRACAUDAL; PERINEURAL ONCE
Status: COMPLETED | OUTPATIENT
Start: 2017-03-08 | End: 2017-03-08

## 2017-03-08 RX ORDER — ONDANSETRON 2 MG/ML
INJECTION INTRAMUSCULAR; INTRAVENOUS
Status: DISCONTINUED | OUTPATIENT
Start: 2017-03-08 | End: 2017-03-08

## 2017-03-08 RX ORDER — CEFAZOLIN SODIUM 2 G/50ML
2 SOLUTION INTRAVENOUS
Status: COMPLETED | OUTPATIENT
Start: 2017-03-08 | End: 2017-03-09

## 2017-03-08 RX ADMIN — NEOSTIGMINE METHYLSULFATE 3 MG: 1 INJECTION INTRAVENOUS at 11:03

## 2017-03-08 RX ADMIN — ROCURONIUM BROMIDE 50 MG: 10 INJECTION, SOLUTION INTRAVENOUS at 09:03

## 2017-03-08 RX ADMIN — PHENYLEPHRINE HYDROCHLORIDE 100 MCG: 10 INJECTION INTRAVENOUS at 11:03

## 2017-03-08 RX ADMIN — HYDROMORPHONE HYDROCHLORIDE 0.2 MG: 1 INJECTION, SOLUTION INTRAMUSCULAR; INTRAVENOUS; SUBCUTANEOUS at 12:03

## 2017-03-08 RX ADMIN — HYDROMORPHONE HYDROCHLORIDE 0.2 MG: 1 INJECTION, SOLUTION INTRAMUSCULAR; INTRAVENOUS; SUBCUTANEOUS at 02:03

## 2017-03-08 RX ADMIN — ACETAMINOPHEN 1000 MG: 10 INJECTION, SOLUTION INTRAVENOUS at 10:03

## 2017-03-08 RX ADMIN — MUPIROCIN 1 G: 20 OINTMENT TOPICAL at 08:03

## 2017-03-08 RX ADMIN — GLYCOPYRROLATE 0.4 MG: 0.2 INJECTION, SOLUTION INTRAMUSCULAR; INTRAVENOUS at 11:03

## 2017-03-08 RX ADMIN — CEFAZOLIN SODIUM 2 G: 2 SOLUTION INTRAVENOUS at 06:03

## 2017-03-08 RX ADMIN — FENTANYL CITRATE 100 MCG: 50 INJECTION, SOLUTION INTRAMUSCULAR; INTRAVENOUS at 09:03

## 2017-03-08 RX ADMIN — KETAMINE HYDROCHLORIDE 10 MG: 100 INJECTION, SOLUTION, CONCENTRATE INTRAMUSCULAR; INTRAVENOUS at 10:03

## 2017-03-08 RX ADMIN — FENTANYL CITRATE 25 MCG: 50 INJECTION, SOLUTION INTRAMUSCULAR; INTRAVENOUS at 11:03

## 2017-03-08 RX ADMIN — ONDANSETRON 4 MG: 2 INJECTION INTRAMUSCULAR; INTRAVENOUS at 11:03

## 2017-03-08 RX ADMIN — Medication 2 G: at 10:03

## 2017-03-08 RX ADMIN — SODIUM CHLORIDE: 0.9 INJECTION, SOLUTION INTRAVENOUS at 12:03

## 2017-03-08 RX ADMIN — FENTANYL CITRATE 50 MCG: 50 INJECTION, SOLUTION INTRAMUSCULAR; INTRAVENOUS at 10:03

## 2017-03-08 RX ADMIN — HYDROCODONE BITARTRATE AND ACETAMINOPHEN 1 TABLET: 5; 325 TABLET ORAL at 12:03

## 2017-03-08 RX ADMIN — HYDROCODONE BITARTRATE AND ACETAMINOPHEN 1 TABLET: 5; 325 TABLET ORAL at 05:03

## 2017-03-08 RX ADMIN — SODIUM CHLORIDE, SODIUM GLUCONATE, SODIUM ACETATE, POTASSIUM CHLORIDE, MAGNESIUM CHLORIDE, SODIUM PHOSPHATE, DIBASIC, AND POTASSIUM PHOSPHATE: .53; .5; .37; .037; .03; .012; .00082 INJECTION, SOLUTION INTRAVENOUS at 09:03

## 2017-03-08 RX ADMIN — PROPOFOL 180 MG: 10 INJECTION, EMULSION INTRAVENOUS at 09:03

## 2017-03-08 RX ADMIN — LIDOCAINE HYDROCHLORIDE 80 MG: 20 INJECTION, SOLUTION INTRAVENOUS at 09:03

## 2017-03-08 RX ADMIN — ROCURONIUM BROMIDE 5 MG: 10 INJECTION, SOLUTION INTRAVENOUS at 11:03

## 2017-03-08 RX ADMIN — HYDROMORPHONE HYDROCHLORIDE 1 MG: 1 INJECTION, SOLUTION INTRAMUSCULAR; INTRAVENOUS; SUBCUTANEOUS at 07:03

## 2017-03-08 RX ADMIN — PHENYLEPHRINE HYDROCHLORIDE 100 MCG: 10 INJECTION INTRAVENOUS at 10:03

## 2017-03-08 RX ADMIN — LIDOCAINE HYDROCHLORIDE 2 MG: 10 INJECTION, SOLUTION EPIDURAL; INFILTRATION; INTRACAUDAL; PERINEURAL at 09:03

## 2017-03-08 RX ADMIN — KETAMINE HYDROCHLORIDE 30 MG: 100 INJECTION, SOLUTION, CONCENTRATE INTRAMUSCULAR; INTRAVENOUS at 09:03

## 2017-03-08 RX ADMIN — MIDAZOLAM HYDROCHLORIDE 1 MG: 1 INJECTION, SOLUTION INTRAMUSCULAR; INTRAVENOUS at 09:03

## 2017-03-08 RX ADMIN — TRAZODONE HYDROCHLORIDE 50 MG: 50 TABLET ORAL at 08:03

## 2017-03-08 RX ADMIN — ROCURONIUM BROMIDE 5 MG: 10 INJECTION, SOLUTION INTRAVENOUS at 10:03

## 2017-03-08 RX ADMIN — SODIUM CHLORIDE: 0.9 INJECTION, SOLUTION INTRAVENOUS at 09:03

## 2017-03-08 NOTE — INTERVAL H&P NOTE
The patient has been examined and the H&P has been reviewed:    I concur with the findings and no changes have occurred since H&P was written.    Anesthesia/Surgery risks, benefits and alternative options discussed and understood by patient/family.    Active Hospital Problems    Diagnosis  POA    Adenocarcinoma, lung [C34.90]  Yes      Resolved Hospital Problems    Diagnosis Date Resolved POA   No resolved problems to display.     To OR for VATS RUL, MND, possible thoracotomy.   Consents verified and scanned in chart.     Margy Laureano MD  General Surgery, PGY-V  [phone]

## 2017-03-08 NOTE — TRANSFER OF CARE
"Anesthesia Transfer of Care Note    Patient: Christi Ridley    Procedure(s) Performed: Procedure(s) (LRB):  THORACOSCOPY-VIDEO ASSISTED (VATS) (Right)  DISSECTION-LYMPH NODE (Right)    Patient location: PACU    Anesthesia Type: general    Transport from OR: Transported from OR on 6-10 L/min O2 by face mask with adequate spontaneous ventilation    Post pain: adequate analgesia    Post assessment: no apparent anesthetic complications    Post vital signs: stable    Level of consciousness: alert and awake    Nausea/Vomiting: no nausea/vomiting    Complications: none          Last vitals:   Visit Vitals    /72 (BP Location: Right arm, Patient Position: Lying, BP Method: Automatic)    Pulse 87    Temp 36.4 °C (97.5 °F) (Axillary)    Resp 18    Ht 5' 3" (1.6 m)    Wt 57.2 kg (126 lb)    SpO2 97%    Breastfeeding No    BMI 22.32 kg/m2     "

## 2017-03-08 NOTE — ANESTHESIA RELEASE NOTE
"Anesthesia Release from PACU Note    Patient: Christi Ridley    Procedure(s) Performed: Procedure(s) (LRB):  THORACOSCOPY-VIDEO ASSISTED (VATS) (Right)  DISSECTION-LYMPH NODE (Right)    Anesthesia type: general    Post pain: Adequate analgesia    Post assessment: no apparent anesthetic complications, tolerated procedure well and no evidence of recall    Last Vitals:   Visit Vitals    /74    Pulse 80    Temp 36.4 °C (97.5 °F) (Axillary)    Resp 20    Ht 5' 3" (1.6 m)    Wt 57.2 kg (126 lb)    SpO2 (!) 94%    Breastfeeding No    BMI 22.32 kg/m2       Post vital signs: stable    Level of consciousness: awake, alert  and oriented    Nausea/Vomiting: no nausea/no vomiting    Complications: none    Airway Patency: patent    Respiratory: unassisted, spontaneous ventilation, nasal cannula    Cardiovascular: stable and blood pressure at baseline    Hydration: euvolemic  "

## 2017-03-08 NOTE — ANESTHESIA PROCEDURE NOTES
Arterial    Diagnosis: lung cancer    Patient location during procedure: done in OR  Procedure start time: 3/8/2017 9:46 AM  Timeout: 3/8/2017 9:46 AM  Procedure end time: 3/8/2017 9:48 AM  Staffing  Anesthesiologist: SAVANA HUTCHINS  Resident/CRNA: MAURA MCLAUGHLIN  Other anesthesia staff: SHIRA VIVAR  Performed by: resident/CRNA   Anesthesiologist was present at the time of the procedure.  Preanesthetic Checklist  Completed: patient identified, site marked, surgical consent, pre-op evaluation, timeout performed, IV checked, risks and benefits discussed, monitors and equipment checked and anesthesia consent given  Arterial Line  Skin Prep: chlorhexidine gluconate  Local Infiltration: none  Orientation: left  Location: radial  Catheter Size: 20 G{[facility] ANESTHESIA BLOCK ART PLACEMENTInsertion Attempts: 1  Assessment  Dressing: secured with tape and tegaderm  Patient: Tolerated well

## 2017-03-08 NOTE — PLAN OF CARE
Pre-op care complete. Orders reviewed and consents verified. Dr. Culp at bedside.  at bedside. Type and screen extension sent to blood bank.

## 2017-03-08 NOTE — ANESTHESIA POSTPROCEDURE EVALUATION
"Anesthesia Post Evaluation    Patient: Christi Ridley    Procedure(s) Performed: Procedure(s) (LRB):  THORACOSCOPY-VIDEO ASSISTED (VATS) (Right)  DISSECTION-LYMPH NODE (Right)    Final Anesthesia Type: general  Patient location during evaluation: PACU  Patient participation: Yes- Able to Participate  Level of consciousness: awake and alert and oriented  Post-procedure vital signs: reviewed and stable  Pain management: adequate  Airway patency: patent  PONV status at discharge: No PONV  Anesthetic complications: no      Cardiovascular status: blood pressure returned to baseline  Respiratory status: unassisted  Hydration status: euvolemic  Follow-up not needed.        Visit Vitals    /74    Pulse 80    Temp 36.4 °C (97.5 °F) (Axillary)    Resp 20    Ht 5' 3" (1.6 m)    Wt 57.2 kg (126 lb)    SpO2 (!) 94%    Breastfeeding No    BMI 22.32 kg/m2       Pain/Noemy Score: Pain Assessment Performed: Yes (3/8/2017 12:00 PM)  Presence of Pain: denies (3/8/2017 12:00 PM)  Pain Rating Prior to Med Admin: 8 (3/8/2017 12:45 PM)  Noemy Score: 8 (3/8/2017 12:00 PM)      "

## 2017-03-08 NOTE — IP AVS SNAPSHOT
[facility]  [address]  [facility] [address]  Phone: [phone]           Patient Discharge Instructions     Our goal is to set you up for success. This packet includes information on your condition, medications, and your home care. It will help you to care for yourself so you don't get sicker and need to go back to the hospital.     Please ask your nurse if you have any questions.        There are many details to remember when preparing to leave the hospital. Here is what you will need to do:    1. Take your medicine. If you are prescribed medications, review your Medication List in the following pages. You may have new medications to  at the pharmacy and others that you'll need to stop taking. Review the instructions for how and when to take your medications. Talk with your doctor or nurses if you are unsure of what to do.     2. Go to your follow-up appointments. Specific follow-up information is listed in the following pages. Your may be contacted by a transition nurse or clinical provider about future appointments. Be sure we have all of the phone numbers to reach you, if needed. Please contact your provider's office if you are unable to make an appointment.     3. Watch for warning signs. Your doctor or nurse will give you detailed warning signs to watch for and when to call for assistance. These instructions may also include educational information about your condition. If you experience any of warning signs to your health, call your doctor.               [facility]  Unless otherwise directed by your provider, please contact [facility], our nurse care line that is available for 24/7 assistance.     [phone] (toll-free)    Registered nurses in the [facility] provide clinical advisement, health education, appointment booking, and other advisory services.                    ** Verify the list of medication(s) below is accurate and up  to date. Carry this with you in case of emergency. If your medications have changed, please notify your healthcare provider.             Medication List      START taking these medications        Additional Info                      senna-docusate 8.6-50 mg 8.6-50 mg per tablet   Commonly known as:  PERICOLACE   Refills:  0   Dose:  1 tablet    Last time this was given:  1 tablet on 3/11/2017  9:10 AM   Instructions:  Take 1 tablet by mouth 2 (two) times daily.     Begin Date    AM    Noon    PM    Bedtime         CHANGE how you take these medications        Additional Info                      * hydrocodone-acetaminophen 5-325mg 5-325 mg per tablet   Commonly known as:  NORCO   Refills:  0   Dose:  1 tablet   What changed:  Another medication with the same name was added. Make sure you understand how and when to take each.    Last time this was given:  1 tablet on 3/11/2017 11:49 AM   Instructions:  Take 1 tablet by mouth every 6 (six) hours as needed for Pain.     Begin Date    AM    Noon    PM    Bedtime       * hydrocodone-acetaminophen 5-325mg 5-325 mg per tablet   Commonly known as:  NORCO   Quantity:  60 tablet   Refills:  0   Dose:  1 tablet   What changed:  You were already taking a medication with the same name, and this prescription was added. Make sure you understand how and when to take each.    Last time this was given:  1 tablet on 3/11/2017 11:49 AM   Instructions:  Take 1 tablet by mouth every 4 (four) hours as needed.     Begin Date    AM    Noon    PM    Bedtime       * Notice:  This list has 2 medication(s) that are the same as other medications prescribed for you. Read the directions carefully, and ask your doctor or other care provider to review them with you.      CONTINUE taking these medications        Additional Info                      alprazolam 0.5 MG tablet   Commonly known as:  XANAX   Refills:  3    Instructions:  TK 1 T PO QID PRA     Begin Date    AM    Noon    PM    Bedtime        calcium carbonate 600 mg (1,500 mg) Tab   Commonly known as:  OS-JACKI   Refills:  0   Dose:  600 mg    Instructions:  Take 600 mg by mouth 2 (two) times daily with meals.     Begin Date    AM    Noon    PM    Bedtime       ergocalciferol 50,000 unit Cap   Commonly known as:  ERGOCALCIFEROL   Refills:  0   Dose:  98945 Units    Instructions:  Take 50,000 Units by mouth every 7 days.     Begin Date    AM    Noon    PM    Bedtime       trazodone 50 MG tablet   Commonly known as:  DESYREL   Refills:  0   Dose:  50 mg    Last time this was given:  50 mg on 3/10/2017  9:05 PM   Instructions:  Take 50 mg by mouth every evening.     Begin Date    AM    Noon    PM    Bedtime            Where to Get Your Medications      You can get these medications from any pharmacy     Bring a paper prescription for each of these medications     hydrocodone-acetaminophen 5-325mg 5-325 mg per tablet       You don't need a prescription for these medications     senna-docusate 8.6-50 mg 8.6-50 mg per tablet                  Please bring to all follow up appointments:    1. A copy of your discharge instructions.  2. All medicines you are currently taking in their original bottles.  3. Identification and insurance card.    Please arrive 15 minutes ahead of scheduled appointment time.    Please call 24 hours in advance if you must reschedule your appointment and/or time.        Follow-up Information     Follow up with Cristobal Culp MD In 1 week.    Specialty:  Cardiothoracic Surgery    Contact information:    [address] [facility] [address]  [phone]          Discharge Instructions     Future Orders    X-Ray Chest PA And Lateral     Questions:    Reason for Exam:      May the Radiologist modify the order per protocol to meet the clinical needs of the patient?:  Yes    Activity as tolerated     Call MD for:  persistent nausea and vomiting or diarrhea     Call MD for:  severe uncontrolled pain     Call MD for:     Comments:     "Temperature > 101F    Diet general     Questions:    Total calories:      Fat restriction, if any:      Protein restriction, if any:      Na restriction, if any:      Fluid restriction:      Additional restrictions:          Primary Diagnosis     Your primary diagnosis was:  Adenocarcinoma, Lung      Admission Information     Date & Time Provider Department CSN    3/8/2017  7:10 AM Cristobal Culp MD [facility] [id number]      Care Providers     Provider Role Specialty Primary office phone    Cristobal Culp MD Attending Provider Cardiothoracic Surgery [phone]    Cristobal Culp MD Surgeon  Cardiothoracic Surgery [phone]      Your Vitals Were     BP Pulse Temp Resp Height Weight    115/71 (BP Location: Right arm, Patient Position: Sitting, BP Method: Automatic) 74 98 °F (36.7 °C) (Oral) 14 5' 3" (1.6 m) 57.2 kg (126 lb)    Last Period SpO2 BMI          (LMP Unknown) 93% 22.32 kg/m2        Recent Lab Values     No lab values to display.      Pending Labs     Order Current Status    Cytology Specimen-Medical Cytology (Fluid/Wash/Brush) In process    Specimen to Pathology - Surgery In process    Prepare RBC 2 Units; bran op Preliminary result      Allergies as of 3/11/2017     No Known Allergies      Advance Directives     An advance directive is a document which, in the event you are no longer able to make decisions for yourself, tells your healthcare team what kind of treatment you do or do not want to receive, or who you would like to make those decisions for you.  If you do not currently have an advance directive, [facility] encourages you to create one.  For more information call:  [phone]), [phone] ([phone]),  or log on to [email or web address].        Language Assistance Services     ATTENTION: Language assistance services are available, free of charge. Please call [phone].      ATENCIÓN: Si habla español, tiene a azul disposición servicios " elsi de asistencia lingüística. Vernell harris [phone].     [facility] Ý: N?u b?n nói Ti?ng Vi?t, có các d?ch v? h? tr? ngôn ng? mi?n phí dành cho b?n. G?i s? [phone].        [facility] Sign-Up     Activating your [facility] account is as easy as 1-2-3!     1) Visit [email or web address], select Sign Up Now, enter this activation code and your date of birth, then select Next.  [email or web address]  Expires: 4/16/2017  1:39 PM      2) Create a username and password to use when you visit [facility] in the future and select a security question in case you lose your password and select Next.    3) Enter your e-mail address and click Sign Up!    Additional Information  If you have questions, please e-mail [email or web address] or call [phone] to talk to our [facility] staff. Remember, [facility] is NOT to be used for urgent needs. For medical emergencies, dial 911.          [facility] complies with applicable Federal civil rights laws and does not discriminate on the basis of race, color, national origin, age, disability, or sex.

## 2017-03-08 NOTE — BRIEF OP NOTE
[facility]  Brief Operative Note    SUMMARY     Surgery Date: 3/8/2017     Surgeon(s) and Role:     * Cristobal Culp MD - Primary     * Margy Laureano MD - Resident - Assisting        Pre-op Diagnosis:  Malignant neoplasm of right upper lobe of lung [C34.11]    Post-op Diagnosis:  Post-Op Diagnosis Codes:     * Malignant neoplasm of right upper lobe of lung [C34.11]    Procedure(s) (LRB):  THORACOSCOPY-VIDEO ASSISTED (VATS) (Right)  DISSECTION-LYMPH NODE (Right)    Anesthesia: General    Description of Procedure: see op note    Description of the findings of the procedure: non-anatomic right upper lobe resection - mass confirmed within specimen with good margin on frozen.    Estimated Blood Loss: 200 mL         Specimens:   Specimen (12h ago through future)    Start     Ordered    03/08/17 1131  Specimen to Pathology - Surgery  Once     Comments:  1. Right upper lobe wedge (please check staple line inked between sutures)- frozen  2. Level 4R-permanent  3. Level 2R- permanent    03/08/17 1133

## 2017-03-08 NOTE — NURSING TRANSFER
Nursing Transfer Note      3/8/2017     Transfer To: 655    Transfer via stretcher    Transfer with O2, cardiac monitoring (tele box)    Transported by RN, PCT    Medicines sent: IVF    Chart send with patient: Yes    Notified: Son    Patient reassessed at: 3/8/17, 1500    Upon arrival to floor: cardiac monitor applied, patient oriented to room, call bell in reach and bed in lowest position

## 2017-03-09 LAB
BASOPHILS # BLD AUTO: 0 K/UL
BASOPHILS # BLD AUTO: 0 K/UL
BASOPHILS NFR BLD: 0 %
BASOPHILS NFR BLD: 0 %
DIFFERENTIAL METHOD: ABNORMAL
DIFFERENTIAL METHOD: ABNORMAL
EOSINOPHIL # BLD AUTO: 0.1 K/UL
EOSINOPHIL # BLD AUTO: 0.1 K/UL
EOSINOPHIL NFR BLD: 1 %
EOSINOPHIL NFR BLD: 1.2 %
ERYTHROCYTE [DISTWIDTH] IN BLOOD BY AUTOMATED COUNT: 14.5 %
ERYTHROCYTE [DISTWIDTH] IN BLOOD BY AUTOMATED COUNT: 14.7 %
HCT VFR BLD AUTO: 23.3 %
HCT VFR BLD AUTO: 24.1 %
HGB BLD-MCNC: 7.4 G/DL
HGB BLD-MCNC: 7.9 G/DL
LYMPHOCYTES # BLD AUTO: 1 K/UL
LYMPHOCYTES # BLD AUTO: 1 K/UL
LYMPHOCYTES NFR BLD: 12.5 %
LYMPHOCYTES NFR BLD: 16 %
MAGNESIUM SERPL-MCNC: 1.2 MG/DL
MCH RBC QN AUTO: 31.1 PG
MCH RBC QN AUTO: 31.2 PG
MCHC RBC AUTO-ENTMCNC: 31.8 %
MCHC RBC AUTO-ENTMCNC: 32.8 %
MCV RBC AUTO: 95 FL
MCV RBC AUTO: 98 FL
MONOCYTES # BLD AUTO: 0.6 K/UL
MONOCYTES # BLD AUTO: 0.9 K/UL
MONOCYTES NFR BLD: 10.9 %
MONOCYTES NFR BLD: 9 %
NEUTROPHILS # BLD AUTO: 4.7 K/UL
NEUTROPHILS # BLD AUTO: 5.9 K/UL
NEUTROPHILS NFR BLD: 73.8 %
NEUTROPHILS NFR BLD: 75.1 %
PHOSPHATE SERPL-MCNC: 3.7 MG/DL
PLATELET # BLD AUTO: 383 K/UL
PLATELET # BLD AUTO: 389 K/UL
PMV BLD AUTO: 8.7 FL
PMV BLD AUTO: 9.2 FL
RBC # BLD AUTO: 2.38 M/UL
RBC # BLD AUTO: 2.53 M/UL
WBC # BLD AUTO: 6.31 K/UL
WBC # BLD AUTO: 7.81 K/UL

## 2017-03-09 PROCEDURE — 63600175 PHARM REV CODE 636 W HCPCS: Performed by: SURGERY

## 2017-03-09 PROCEDURE — 20600001 HC STEP DOWN PRIVATE ROOM

## 2017-03-09 PROCEDURE — 25000003 PHARM REV CODE 250: Performed by: SURGERY

## 2017-03-09 PROCEDURE — 36415 COLL VENOUS BLD VENIPUNCTURE: CPT

## 2017-03-09 PROCEDURE — 85025 COMPLETE CBC W/AUTO DIFF WBC: CPT

## 2017-03-09 PROCEDURE — 84100 ASSAY OF PHOSPHORUS: CPT

## 2017-03-09 PROCEDURE — 83735 ASSAY OF MAGNESIUM: CPT

## 2017-03-09 PROCEDURE — 27000221 HC OXYGEN, UP TO 24 HOURS

## 2017-03-09 PROCEDURE — 25000003 PHARM REV CODE 250: Performed by: STUDENT IN AN ORGANIZED HEALTH CARE EDUCATION/TRAINING PROGRAM

## 2017-03-09 RX ADMIN — STANDARDIZED SENNA CONCENTRATE AND DOCUSATE SODIUM 1 TABLET: 8.6; 5 TABLET, FILM COATED ORAL at 09:03

## 2017-03-09 RX ADMIN — POLYETHYLENE GLYCOL 3350 17 G: 17 POWDER, FOR SOLUTION ORAL at 09:03

## 2017-03-09 RX ADMIN — ENOXAPARIN SODIUM 40 MG: 100 INJECTION SUBCUTANEOUS at 09:03

## 2017-03-09 RX ADMIN — CEFAZOLIN SODIUM 2 G: 2 SOLUTION INTRAVENOUS at 09:03

## 2017-03-09 RX ADMIN — HYDROMORPHONE HYDROCHLORIDE 1 MG: 1 INJECTION, SOLUTION INTRAMUSCULAR; INTRAVENOUS; SUBCUTANEOUS at 01:03

## 2017-03-09 RX ADMIN — HYDROCODONE BITARTRATE AND ACETAMINOPHEN 1 TABLET: 5; 325 TABLET ORAL at 06:03

## 2017-03-09 RX ADMIN — HYDROCODONE BITARTRATE AND ACETAMINOPHEN 1 TABLET: 5; 325 TABLET ORAL at 11:03

## 2017-03-09 RX ADMIN — MUPIROCIN 1 G: 20 OINTMENT TOPICAL at 09:03

## 2017-03-09 RX ADMIN — HYDROCODONE BITARTRATE AND ACETAMINOPHEN 1 TABLET: 5; 325 TABLET ORAL at 04:03

## 2017-03-09 RX ADMIN — HYDROCODONE BITARTRATE AND ACETAMINOPHEN 1 TABLET: 5; 325 TABLET ORAL at 09:03

## 2017-03-09 RX ADMIN — HYDROMORPHONE HYDROCHLORIDE 1 MG: 1 INJECTION, SOLUTION INTRAMUSCULAR; INTRAVENOUS; SUBCUTANEOUS at 09:03

## 2017-03-09 RX ADMIN — TRAZODONE HYDROCHLORIDE 50 MG: 50 TABLET ORAL at 09:03

## 2017-03-09 RX ADMIN — CEFAZOLIN SODIUM 2 G: 2 SOLUTION INTRAVENOUS at 01:03

## 2017-03-09 RX ADMIN — PANTOPRAZOLE SODIUM 40 MG: 40 TABLET, DELAYED RELEASE ORAL at 06:03

## 2017-03-09 NOTE — OP NOTE
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 Right Thoracoscopy with Apical-Posterior Segmentectomy.  2. Right Thoracoscopy with Mediastinal Lymph Node Dissection    Surgeon: Cristobal Culp MD    Assistant(s): Margy Laureano MD    Anesthesia: GETA    Findings: Thin inflammatory adhesions between RUL and chest wall.  No evidence of extraparenchymal disease.  Right upper lobe nodule completely excised with good margin (margin negative on frozen section)    Estimated Blood Loss: 200mL    Drains: 24 Upper sorbian chest tube    Specimen(s): Right Upper Lobe; Levels 2R and 4R    Complications: None    Indications for Procedure: 72 yo female with biopsy-proven adenocarcinoma of the upper lobe of the right lung.  She is a reasonable candidate for resection, but it was decided to proceed with sublobar resection to to significant obstructive lung disease.  Risks, benefits and possible outcomes were discussed in detail with the patient, and she  and her family were given the opportunity to ask questions and have those questions answered to their satisfaction.  she desires to proceed and signed consent.    Procedure in Detail: The patient was taken to the operating room and placed supine on the operating table. Adequate general anesthesia was achieved. Double lumen endotracheal tube was placed and its position and function were confirmed with bronchoscopy. She was turned to the left lateral decubitus position, padded appropriately and secured. Right chest was prepped and draped in standard sterile fashion. Intravenous antibiotics were administered. Right lung was isolated. Time-out was performed. A 1.5cm incision was made in the 8th intercostal space in the posterior axillary line. Port and camera were inserted. There was no evidence of extraparenchymal disease. A 4cm incision was made in the 5th  intercostal space in the anterior axillary line. Subcutaneous tissue was divided with electrocautery and the chest was entered. Wound protector was placed. There were a few thin inflammatory adhesions between the right upper lobe and the lateral chest wall that were taken down using electrocautery.  The apex of the lung was grasped and retracted inferiorly. The apical and posterior segments, as well as the majority of the anterior segment (essentially a simultaneously stapled upper lobectomy), were resected using an EndoGIA with purple and black loads with seam guards. There was a good margin between the mass and the nearest staple line.  Frozen section showed no evidence of malignancy in the margin.  Mediastinal lymph node dissection was performed, including levels 2R and 4R. The chest was irrigated with one liter of sterile water. All irrigation was evacuated. Joseph powder was applied to mediastinal lymph node dissection sites. ProGel was applied to the parenchymal staple line. Then, 20mL of 1.3% Exparel was mixed with 10mL normal saline and used for a regional intercostal thoracic block; 3ml were injected into the intercostal spaces from T3-T10 under direct vision after aspiration to ensure extravascular injection. A 24 Kenyan straight chest tube was passed through the camera port and directed posterior-apically. It was secured to the skin with Neurolon suture. Lung was inflated under direct vision. Incision was closed in layers with absorbable suture. Steri-strips and sterile dressings were applied. All sponge, needle and instrument counts were correct at the end of the case. The patient tolerated the procedure well. There were no immediate complications. She was extubated in the operating room.    Disposition: PACU in stable condition

## 2017-03-09 NOTE — PLAN OF CARE
Problem: Patient Care Overview  Goal: Plan of Care Review  Outcome: Ongoing (interventions implemented as appropriate)  POC reviewed with pt and pt's son, both verbalized understanding. AAOx4. CT to water seal, air leak present (bubbling noted upon arrival to floor, Dr. Sims notified). Pt denies SOB and chest pain. Complains of  R upper chest/incisional pain. Prn pain meds given with relief noted. Tolerating clear liquid diet, no N/V. Pt voided x2 in bathroom but unable to measure due to pt missing hat. + bowel sounds (pt does not want to take laxative due to last admission pt had uncontrolled diarrhea). Pt resting comfortably.

## 2017-03-09 NOTE — PLAN OF CARE
Problem: Patient Care Overview  Goal: Plan of Care Review  Outcome: Ongoing (interventions implemented as appropriate)  Plan of care reviewed, patient verbalized understanding. VSS, AAOx4. PRN Lortab and Dilaudid were given for pain. Tolerating Clear liquid diet. Rt chest tube is connected to water seal (air leak is present, Primary team is aware). Sinus rhythm on telemetry. Free from falls/injuries. No acute distress noted. Client is resting with call light in reach. Will continue to monitor.

## 2017-03-09 NOTE — PLAN OF CARE
S/P Right Thoracoscopy on 3/8/17. CM spoke with pt who stated had hip surgery in January. Pt has DME. CM asked pt if she thought she needed HH. She stated she never had HH from the hip surgery and she doesn't need it. IMM form signed, original in chart, and copy given to pt.         03/09/17 7616   Discharge Assessment   Assessment Type Discharge Planning Assessment   Confirmed/corrected address and phone number on facesheet? Yes   Assessment information obtained from? Patient   Expected Length of Stay (days) 3   Prior to hospitilization cognitive status: Alert/Oriented   Prior to hospitalization functional status: Assistive Equipment   Current cognitive status: Alert/Oriented   Current Functional Status: Assistive Equipment   Arrived From admitted as an inpatient   Lives With spouse   Able to Return to Prior Arrangements yes   Is patient able to care for self after discharge? Yes   How many people do you have in your home that can help with your care after discharge? 1   Who are your caregiver(s) and their phone number(s)? phoenix Her, [phone]   Patient's perception of discharge disposition admitted as an inpatient   Readmission Within The Last 30 Days no previous admission in last 30 days   Patient currently being followed by outpatient case management? No   Patient currently receives home health services? No   Does the patient currently use HME? No   Patient currently receives private duty nursing? No   Patient currently receives any other outside agency services? No   Equipment Currently Used at Home walker, rolling;wheelchair;bedside commode   Do you have any problems affording any of your prescribed medications? No   Is the patient taking medications as prescribed? yes   Do you have any financial concerns preventing you from receiving the healthcare you need? No   Does the patient have transportation to healthcare appointments? Yes   Transportation Available family or friend will provide   Discharge Plan  A Home with family   Discharge Plan B Home with family;Home Health   Patient/Family In Agreement With Plan yes

## 2017-03-09 NOTE — PLAN OF CARE
Problem: Patient Care Overview  Goal: Plan of Care Review  Outcome: Ongoing (interventions implemented as appropriate)  Review care plan with patient. Patient verbalized understand and Spouse.  Patient AAO. Spoke with patient regarding ambulating 3x a day in [place].  Patient up to side of bed with assist. Patient remains free from falls or injury at this this. Chest tube to continuous wall suction. V/S stable, on room air. Tolerating regular diet. NSR on telemetry. Pain controlled with prn pain medication. Frequent rounds made. Call light in reach. Bed in low position. WCTM

## 2017-03-09 NOTE — PROGRESS NOTES
Progress Note  Thoracic Surgery    Admit Date: 3/8/2017  Attending: Suni  S/P: Procedure(s) (LRB):  THORACOSCOPY-VIDEO ASSISTED (VATS) (Right)  DISSECTION-LYMPH NODE (Right)    Post-operative Day: 1 Day Post-Op    Hospital Day: 2    SUBJECTIVE:     NAEON.  Pain well controlled.  No fevers / chills.  Ambulating well.  igor CLD.  No other issues.    OBJECTIVE:     Vital Signs (Most Recent)  Temp: 98.1 °F (36.7 °C) (03/09/17 0400)  Pulse: 86 (03/09/17 0400)  Resp: 17 (03/09/17 0400)  BP: 112/61 (03/09/17 0400)  SpO2: 95 % (03/09/17 0400)    Vital Signs Range (Last 24H):  Temp:  [97.5 °F (36.4 °C)-98.8 °F (37.1 °C)]   Pulse:  [75-92]   Resp:  [14-20]   BP: (109-139)/(61-80)   SpO2:  [94 %-99 %]   Arterial Line BP: (108-120)/(64-77)     I & O (Last 24H):  Intake/Output Summary (Last 24 hours) at 03/09/17 0721  Last data filed at 03/09/17 0400   Gross per 24 hour   Intake             2023 ml   Output              399 ml   Net             1624 ml     Physical Exam:  General: well developed, well nourished, no distress  Lungs:  clear to auscultation bilaterally and normal respiratory effort  Heart: regular rate and rhythm  Abdomen: soft, non-tender non-distented; bowel sounds normal; no masses,  no organomegaly    Wound/Incision:  clean, dry, intact    Laboratory:  CBC:   Recent Labs  Lab 03/09/17  0342   WBC 6.31   RBC 2.38*   HGB 7.4*   HCT 23.3*   *   MCV 98   MCH 31.1*   MCHC 31.8*     CMP:   Recent Labs  Lab 03/08/17  1214   *  116*   CALCIUM 7.8*  7.8*   *  134*   K 3.9  3.9   CO2 24  24     104   BUN 11  11   CREATININE 0.7  0.7     Coagulation: No results for input(s): INR, APTT in the last 168 hours.    Invalid input(s): PT  Labs within the past 24 hours have been reviewed.    ASSESSMENT/PLAN:     Assessment: 72 y/o female s/p R VATS apical-posterior segmentectomy and MLND, doing well.    Plan:   Reg diet as igor  HLIV  IS, pulmonary toilet  Likely leave CT one more day given  apical pneumo, though this likely related to resection - will discuss with staff  Ambulate  Repeat CBC this PM for anemia    Margy Laureano MD  General Surgery, PGY-V  [phone]

## 2017-03-10 LAB
BASOPHILS # BLD AUTO: 0 K/UL
BASOPHILS NFR BLD: 0 %
DIFFERENTIAL METHOD: ABNORMAL
EOSINOPHIL # BLD AUTO: 0.2 K/UL
EOSINOPHIL NFR BLD: 2.3 %
ERYTHROCYTE [DISTWIDTH] IN BLOOD BY AUTOMATED COUNT: 14.7 %
HCT VFR BLD AUTO: 23 %
HGB BLD-MCNC: 7.4 G/DL
LYMPHOCYTES # BLD AUTO: 0.9 K/UL
LYMPHOCYTES NFR BLD: 12.3 %
MAGNESIUM SERPL-MCNC: 1.4 MG/DL
MCH RBC QN AUTO: 31.1 PG
MCHC RBC AUTO-ENTMCNC: 32.2 %
MCV RBC AUTO: 97 FL
MONOCYTES # BLD AUTO: 0.9 K/UL
MONOCYTES NFR BLD: 12.8 %
NEUTROPHILS # BLD AUTO: 5 K/UL
NEUTROPHILS NFR BLD: 72.6 %
PHOSPHATE SERPL-MCNC: 3 MG/DL
PLATELET # BLD AUTO: 370 K/UL
PMV BLD AUTO: 9.1 FL
RBC # BLD AUTO: 2.38 M/UL
WBC # BLD AUTO: 6.89 K/UL

## 2017-03-10 PROCEDURE — 83735 ASSAY OF MAGNESIUM: CPT

## 2017-03-10 PROCEDURE — 63600175 PHARM REV CODE 636 W HCPCS: Performed by: SURGERY

## 2017-03-10 PROCEDURE — 25000003 PHARM REV CODE 250: Performed by: STUDENT IN AN ORGANIZED HEALTH CARE EDUCATION/TRAINING PROGRAM

## 2017-03-10 PROCEDURE — 20600001 HC STEP DOWN PRIVATE ROOM

## 2017-03-10 PROCEDURE — 63600175 PHARM REV CODE 636 W HCPCS: Performed by: ANESTHESIOLOGY

## 2017-03-10 PROCEDURE — 25000003 PHARM REV CODE 250: Performed by: SURGERY

## 2017-03-10 PROCEDURE — 97162 PT EVAL MOD COMPLEX 30 MIN: CPT

## 2017-03-10 PROCEDURE — 97116 GAIT TRAINING THERAPY: CPT

## 2017-03-10 PROCEDURE — 63600175 PHARM REV CODE 636 W HCPCS: Performed by: PHYSICIAN ASSISTANT

## 2017-03-10 PROCEDURE — 85025 COMPLETE CBC W/AUTO DIFF WBC: CPT

## 2017-03-10 PROCEDURE — 36415 COLL VENOUS BLD VENIPUNCTURE: CPT

## 2017-03-10 PROCEDURE — 25000003 PHARM REV CODE 250: Performed by: ANESTHESIOLOGY

## 2017-03-10 PROCEDURE — 84100 ASSAY OF PHOSPHORUS: CPT

## 2017-03-10 RX ORDER — ALBUTEROL SULFATE 2.5 MG/.5ML
2.5 SOLUTION RESPIRATORY (INHALATION) EVERY 4 HOURS PRN
Status: DISCONTINUED | OUTPATIENT
Start: 2017-03-10 | End: 2017-03-11 | Stop reason: HOSPADM

## 2017-03-10 RX ORDER — FUROSEMIDE 10 MG/ML
20 INJECTION INTRAMUSCULAR; INTRAVENOUS ONCE
Status: COMPLETED | OUTPATIENT
Start: 2017-03-10 | End: 2017-03-10

## 2017-03-10 RX ADMIN — TRAZODONE HYDROCHLORIDE 50 MG: 50 TABLET ORAL at 09:03

## 2017-03-10 RX ADMIN — STANDARDIZED SENNA CONCENTRATE AND DOCUSATE SODIUM 1 TABLET: 8.6; 5 TABLET, FILM COATED ORAL at 09:03

## 2017-03-10 RX ADMIN — MAGNESIUM SULFATE HEPTAHYDRATE 3 G: 500 INJECTION, SOLUTION INTRAMUSCULAR; INTRAVENOUS at 05:03

## 2017-03-10 RX ADMIN — MUPIROCIN 1 G: 20 OINTMENT TOPICAL at 10:03

## 2017-03-10 RX ADMIN — HYDROMORPHONE HYDROCHLORIDE 1 MG: 1 INJECTION, SOLUTION INTRAMUSCULAR; INTRAVENOUS; SUBCUTANEOUS at 10:03

## 2017-03-10 RX ADMIN — FUROSEMIDE 20 MG: 10 INJECTION, SOLUTION INTRAMUSCULAR; INTRAVENOUS at 08:03

## 2017-03-10 RX ADMIN — STANDARDIZED SENNA CONCENTRATE AND DOCUSATE SODIUM 1 TABLET: 8.6; 5 TABLET, FILM COATED ORAL at 08:03

## 2017-03-10 RX ADMIN — HYDROCODONE BITARTRATE AND ACETAMINOPHEN 1 TABLET: 5; 325 TABLET ORAL at 05:03

## 2017-03-10 RX ADMIN — PANTOPRAZOLE SODIUM 40 MG: 40 TABLET, DELAYED RELEASE ORAL at 06:03

## 2017-03-10 RX ADMIN — HYDROCODONE BITARTRATE AND ACETAMINOPHEN 1 TABLET: 5; 325 TABLET ORAL at 04:03

## 2017-03-10 RX ADMIN — POLYETHYLENE GLYCOL 3350 17 G: 17 POWDER, FOR SOLUTION ORAL at 08:03

## 2017-03-10 RX ADMIN — ENOXAPARIN SODIUM 40 MG: 100 INJECTION SUBCUTANEOUS at 08:03

## 2017-03-10 RX ADMIN — HYDROCODONE BITARTRATE AND ACETAMINOPHEN 1 TABLET: 5; 325 TABLET ORAL at 09:03

## 2017-03-10 NOTE — PROGRESS NOTES
Progress Note  Thoracic Surgery    Admit Date: 3/8/2017  Attending: Suni  S/P: Procedure(s) (LRB):  THORACOSCOPY-VIDEO ASSISTED (VATS) (Right)  DISSECTION-LYMPH NODE (Right)    Post-operative Day: 2 Days Post-Op    Hospital Day: 3    SUBJECTIVE:     NAEON.  Pain well controlled.  No fevers / chills.  Ambulating well.  Increased sputum production overnight. No SOB or chest pain. No other issues.    OBJECTIVE:     Vital Signs (Most Recent)  Temp: 98.6 °F (37 °C) (03/10/17 0400)  Pulse: 73 (03/10/17 0700)  Resp: 18 (03/10/17 0400)  BP: 121/63 (03/10/17 0400)  SpO2: (!) 93 % (03/10/17 0400)    Vital Signs Range (Last 24H):  Temp:  [97 °F (36.1 °C)-99 °F (37.2 °C)]   Pulse:  []   Resp:  [16-18]   BP: (102-121)/(58-69)   SpO2:  [92 %-98 %]     I & O (Last 24H):    Intake/Output Summary (Last 24 hours) at 03/10/17 0758  Last data filed at 03/10/17 0500   Gross per 24 hour   Intake              831 ml   Output              640 ml   Net              191 ml     Physical Exam   Constitutional: Well-developed and well-nourished.  Neuro: alert and oriented  Cardiovascular: Normal rate  Pulmonary/Chest: Effort normal, unlabored breathing, chest tube in place - to suction, no air leaks  Abdominal: Non-distended  Musculoskeletal: Normal range of motion. He exhibits no edema.   Skin: Skin is warm and dry.    Wound/Incision: soaked dressing    Laboratory:  CBC:     Recent Labs  Lab 03/10/17  0526   WBC 6.89   RBC 2.38*   HGB 7.4*   HCT 23.0*   *   MCV 97   MCH 31.1*   MCHC 32.2     CMP:     Recent Labs  Lab 03/08/17  1214   *  116*   CALCIUM 7.8*  7.8*   *  134*   K 3.9  3.9   CO2 24  24     104   BUN 11  11   CREATININE 0.7  0.7     Coagulation: No results for input(s): INR, APTT in the last 168 hours.    Invalid input(s): PT  Labs within the past 24 hours have been reviewed.    ASSESSMENT/PLAN:     Assessment: 70 y/o female s/p R VATS apical-posterior segmentectomy and MLND, doing  well.    Plan:   - Reg diet as igor  - HLIV  - IS, pulmonary toilet  - albuterol only neb (no steroids)  - replace electrolytes as needed  - PT/OT  - Ambulate  - dressing change today  - CT to waterseal today  - will likely D/C chest tube tomorrow AM with possible discharge of patient tomorrow PM s/p post-pull CXR.    Cheyenne Bauman MD  PGY-1  Thoracic Surgery

## 2017-03-10 NOTE — PLAN OF CARE
Problem: Patient Care Overview  Goal: Plan of Care Review  Outcome: Ongoing (interventions implemented as appropriate)  Plan of care reviewed with patient and family. Verbal understanding received. Patient had VATS of Right lung nodule dissection. Right chest incision with chest tube, covered with gauze. CT to water suction patients VSS,  OS will drop with exercise, md aware, OT aware,  High possibility patient will not need Supplemental O2 at home. Pain controlled with PRN medication. Lasix injection given, bedside commode in room. Regular diet tolerating well. Up with 1 assist. RN will continue to monitor

## 2017-03-10 NOTE — PLAN OF CARE
Problem: Physical Therapy Goal  Goal: Physical Therapy Goal  Goals to be met by: 3/24/2017     Patient will increase functional independence with mobility by performin. Supine to sit with Stand-by Assistance  2. Sit to supine with Stand-by Assistance  3. Sit to stand transfer with Stand-by Assistance demonstrating correct hand placement with RW consistently   4. Gait x 100 feet with Stand-by Assistance using Rolling Walker with O2 sats >90% during gait.   Outcome: Ongoing (interventions implemented as appropriate)  Initial eval completed. Results, POC and goals discussed with patient and family .  Patient in agreement with POC.

## 2017-03-10 NOTE — PT/OT/SLP EVAL
"Physical Therapy  Evaluation and Treatment    Christi Ridley   MRN: [id number]   Admitting Diagnosis: Adenocarcinoma, lung s/p VATs and R chest tube placement     PT Received On: 03/10/17  PT Start Time: 1016     PT Stop Time: 1042    PT Total Time (min): 26 min       Billable Minutes:  Evaluation 15 -Moderate Complexity  Gait [id number]    Diagnosis: Adenocarcinoma, lung  Comorbidities and personal factors that affect the PT POC:  1. COPD  2. Emphysema  3. Recent R hip fx (1 month ago)  4. Rheumatoid arthritis, not joyce to take arthritis meds in preparation for this surgery    History reviewed. No pertinent past medical history.   History reviewed. No pertinent surgical history.    Referring physician: EUFEMIA Ross  Date referred to PT: 3/9/2017    General Precautions: Standard,      Patient History:  Living Environment Comment: Patient lives with her  in a 1 story home with no steps to enter.  She recently experienced a fall with fx to her hip (1 month ago) and has been ambulating with a RW since this time.    Equipment Currently Used at Home:  (RW, w/c, BSC)  DME owned (not currently used): none    Subjective:  Communicated with RN prior to session.  "I don't see why I have to do this.  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  I really like to joseph."  Chief Complaint: R posterior back pain at chest tube site  Patient goals: return to gambling.     Pain Rating: 3/10    Pain Addressed: Nurse notified  Pain Rating Post-Intervention: 3/10    Objective:   Patient found with:  (chest tube, peripheral IV)   Patient found sitting EOB with PCT after ambulating to the bathroom.  Patient agreed to therapy session after explanation of role of PT and encouragement.  Patient ambulated with therapist in the [place] using RW, but demonstrated drop in O2 sats during gait trial.  Sats improve rapidly with rest break.  Therapist educated family on improving endurance, practicing gait with rest " breaks. Therapist communicated drop in O2 sats with activity to RN.     Cognitive Exam:  Oriented to: Person, Place, Time and Situation    Follows Commands/attention: Follows two-step commands  Communication: clear/fluent  Safety awareness/insight to disability: intact    Physical Exam:  Postural examination/scapula alignment: Rounded shoulder and Head forward    Skin integrity: Visible skin intact  Edema: moderate edema RUE, minimal edema BLEs    Sensation:   Impaired LT bilateral feet (chronic) Therapist educated patient on skin protection and inspection.   Intact DP BLEs    Lower Extremity Range of Motion:  Right Lower Extremity: decreased 10% in R hip, otherwise WNL  Left Lower Extremity: WFL    Lower Extremity Strength:  Right Lower Extremity: hip 3/5, knee 4/5, ankle 5/5  Left Lower Extremity: WFL     Fine motor coordination:  Intact    Gross motor coordination: WFL    Functional Mobility:  Bed Mobility:  Rolling/Turning to Left: Stand by assistance  Rolling/Turning Right: Stand by assistance  Supine to Sit: Minimum Assistance  Sit to Supine: Minimum Assistance    Transfers:  Sit <> Stand Assistance: Stand By Assistance (with verbal cues for proper hand placement with RW)    Gait:   Gait Distance: Patient ambulated a total of 100 feet with several seated rest breaks with stand by assist using a RW.  Pt demonstrated gait deviations and decreased O2 sats during gait trial. Gait deviations: antalgic gait pattern on R, step to gait pattern, increased R knee flexion at initial contact.    Cardiovascular/Pulmonary system  · Gait 10 feet initially with O2 sats dropping to 81% on room air.  Therapist instructed patient to take standing rest break and breathe deeply.  O2 sats adria to 88% in < 5 minutes  · Gait 20 feet with O2 sats 89%, followed by seated rest break  · Gait 20 feet with O2 sats 92%, followed by seated rest break  · Gait 50 feet (to return to room) with O2 sats dropping to 81%,  Followed by seated rest  break with O2 sats increasing to 89% within 1 minute.      Therapeutic Activities and Exercises:  Therapist educated patient and family extensively on improving endurance during gait, encouraged use of IS, and discussed decreased O2 sats with mobility.     AM-PAC 6 CLICK MOBILITY  How much help from another person does this patient currently need?   1 = Unable, Total/Dependent Assistance  2 = A lot, Maximum/Moderate Assistance  3 = A little, Minimum/Contact Guard/Supervision  4 = None, Modified [place]/Independent    Turning over in bed (including adjusting bedclothes, sheets and blankets)?: 3  Sitting down on and standing up from a chair with arms (e.g., wheelchair, bedside commode, etc.): 3  Moving from lying on back to sitting on the side of the bed?: 3  Moving to and from a bed to a chair (including a wheelchair)?: 3  Need to walk in hospital room?: 3  Climbing 3-5 steps with a railing?: 3  Total Score: 18     AM-PAC Raw Score CMS G-Code Modifier Level of Impairment Assistance   6 % Total / Unable   7 - 9 CM 80 - 100% Maximal Assist   10 - 14 CL 60 - 80% Moderate Assist   15 - 19 CK 40 - 60% Moderate Assist   20 - 22 CJ 20 - 40% Minimal Assist   23 CI 1-20% SBA / CGA   24 CH 0% Independent/ Mod I     Patient left up in chair with all lines intact, call button in reach, RN notified and family  present.    Clinical Presentation: evolving/complicating factors.  Patient demonstrated frequent oxygen desaturation in response to activity.  Activity level had to be continually adjusted and monitored during evaluation.     Assessment:   Christi Ridley is a 71 y.o. female with a medical diagnosis of Adenocarcinoma, lung and presents with decreased functional mobility, decreased endurance, and oxygen desaturation with activity.  Patient participated well but needs reinforcement as to the purpose and role of PT.  Patient is demonstrating oxygen desaturation with activity, but sats respond quickly to rest  and improved breathing pattern.  Patient would benefit from skilled PT services to address deficits during hospital stay and HH therapy upon discharge.  Patient will need encouragement to agree to therapy after discharge.     Rehab identified problem list/impairments: Rehab identified problem list/impairments: weakness, impaired endurance, impaired functional mobilty, impaired skin, edema, impaired cardiopulmonary response to activity    Rehab potential is good.    Activity tolerance: Fair    Discharge recommendations: Discharge Facility/Level Of Care Needs: home with home health     Barriers to discharge: Barriers to Discharge: None    Equipment recommendations: Equipment Needed After Discharge: none     GOALS:   Physical Therapy Goals        Problem: Physical Therapy Goal    Goal Priority Disciplines Outcome Goal Variances Interventions   Physical Therapy Goal     PT/OT, PT Ongoing (interventions implemented as appropriate)     Description:  Goals to be met by: 3/24/2017     Patient will increase functional independence with mobility by performin. Supine to sit with Stand-by Assistance  2. Sit to supine with Stand-by Assistance  3. Sit to stand transfer with Stand-by Assistance demonstrating correct hand placement with RW consistently   4. Gait  x 100 feet with Stand-by Assistance using Rolling Walker with O2 sats >90% during gait.                 PLAN:    Patient to be seen 4 x/week to address the above listed problems via gait training, therapeutic activities, therapeutic exercises  Plan of Care expires: 04/10/17  Plan of Care reviewed with: patient, spouse, family          Mariluz Landaverde, PT  03/10/2017

## 2017-03-11 VITALS
TEMPERATURE: 98 F | SYSTOLIC BLOOD PRESSURE: 115 MMHG | RESPIRATION RATE: 14 BRPM | WEIGHT: 126 LBS | HEART RATE: 74 BPM | OXYGEN SATURATION: 93 % | DIASTOLIC BLOOD PRESSURE: 71 MMHG | BODY MASS INDEX: 22.32 KG/M2 | HEIGHT: 63 IN

## 2017-03-11 LAB
ANION GAP SERPL CALC-SCNC: 7 MMOL/L
BASOPHILS # BLD AUTO: 0.01 K/UL
BASOPHILS NFR BLD: 0.2 %
BUN SERPL-MCNC: 9 MG/DL
CALCIUM SERPL-MCNC: 8.3 MG/DL
CHLORIDE SERPL-SCNC: 101 MMOL/L
CO2 SERPL-SCNC: 28 MMOL/L
CREAT SERPL-MCNC: 0.7 MG/DL
DIFFERENTIAL METHOD: ABNORMAL
EOSINOPHIL # BLD AUTO: 0.2 K/UL
EOSINOPHIL NFR BLD: 2.8 %
ERYTHROCYTE [DISTWIDTH] IN BLOOD BY AUTOMATED COUNT: 14.5 %
EST. GFR  (AFRICAN AMERICAN): >60 ML/MIN/1.73 M^2
EST. GFR  (NON AFRICAN AMERICAN): >60 ML/MIN/1.73 M^2
GLUCOSE SERPL-MCNC: 82 MG/DL
HCT VFR BLD AUTO: 22.8 %
HGB BLD-MCNC: 7.6 G/DL
LYMPHOCYTES # BLD AUTO: 0.9 K/UL
LYMPHOCYTES NFR BLD: 16.4 %
MAGNESIUM SERPL-MCNC: 1.6 MG/DL
MCH RBC QN AUTO: 30.9 PG
MCHC RBC AUTO-ENTMCNC: 33.3 %
MCV RBC AUTO: 93 FL
MONOCYTES # BLD AUTO: 0.5 K/UL
MONOCYTES NFR BLD: 9.6 %
NEUTROPHILS # BLD AUTO: 3.7 K/UL
NEUTROPHILS NFR BLD: 70.8 %
PHOSPHATE SERPL-MCNC: 3.2 MG/DL
PLATELET # BLD AUTO: 389 K/UL
PMV BLD AUTO: 9 FL
POTASSIUM SERPL-SCNC: 4.1 MMOL/L
RBC # BLD AUTO: 2.46 M/UL
SODIUM SERPL-SCNC: 136 MMOL/L
WBC # BLD AUTO: 5.29 K/UL

## 2017-03-11 PROCEDURE — 25000003 PHARM REV CODE 250: Performed by: STUDENT IN AN ORGANIZED HEALTH CARE EDUCATION/TRAINING PROGRAM

## 2017-03-11 PROCEDURE — 85025 COMPLETE CBC W/AUTO DIFF WBC: CPT

## 2017-03-11 PROCEDURE — 63600175 PHARM REV CODE 636 W HCPCS: Performed by: SURGERY

## 2017-03-11 PROCEDURE — 25000003 PHARM REV CODE 250: Performed by: SURGERY

## 2017-03-11 PROCEDURE — 36415 COLL VENOUS BLD VENIPUNCTURE: CPT

## 2017-03-11 PROCEDURE — 80048 BASIC METABOLIC PNL TOTAL CA: CPT

## 2017-03-11 PROCEDURE — 84100 ASSAY OF PHOSPHORUS: CPT

## 2017-03-11 PROCEDURE — 83735 ASSAY OF MAGNESIUM: CPT

## 2017-03-11 RX ORDER — AMOXICILLIN 250 MG
1 CAPSULE ORAL 2 TIMES DAILY
COMMUNITY
Start: 2017-03-11

## 2017-03-11 RX ORDER — LANOLIN ALCOHOL/MO/W.PET/CERES
800 CREAM (GRAM) TOPICAL ONCE
Status: COMPLETED | OUTPATIENT
Start: 2017-03-11 | End: 2017-03-11

## 2017-03-11 RX ORDER — HYDROCODONE BITARTRATE AND ACETAMINOPHEN 5; 325 MG/1; MG/1
1 TABLET ORAL EVERY 4 HOURS PRN
Qty: 60 TABLET | Refills: 0 | Status: SHIPPED | OUTPATIENT
Start: 2017-03-11 | End: 2017-03-27

## 2017-03-11 RX ADMIN — POLYETHYLENE GLYCOL 3350 17 G: 17 POWDER, FOR SOLUTION ORAL at 09:03

## 2017-03-11 RX ADMIN — HYDROCODONE BITARTRATE AND ACETAMINOPHEN 1 TABLET: 5; 325 TABLET ORAL at 11:03

## 2017-03-11 RX ADMIN — HYDROMORPHONE HYDROCHLORIDE 1 MG: 1 INJECTION, SOLUTION INTRAMUSCULAR; INTRAVENOUS; SUBCUTANEOUS at 04:03

## 2017-03-11 RX ADMIN — MAGNESIUM OXIDE TAB 400 MG (241.3 MG ELEMENTAL MG) 800 MG: 400 (241.3 MG) TAB at 10:03

## 2017-03-11 RX ADMIN — ENOXAPARIN SODIUM 40 MG: 100 INJECTION SUBCUTANEOUS at 09:03

## 2017-03-11 RX ADMIN — PANTOPRAZOLE SODIUM 40 MG: 40 TABLET, DELAYED RELEASE ORAL at 07:03

## 2017-03-11 RX ADMIN — HYDROCODONE BITARTRATE AND ACETAMINOPHEN 1 TABLET: 5; 325 TABLET ORAL at 07:03

## 2017-03-11 RX ADMIN — STANDARDIZED SENNA CONCENTRATE AND DOCUSATE SODIUM 1 TABLET: 8.6; 5 TABLET, FILM COATED ORAL at 09:03

## 2017-03-11 RX ADMIN — HYDROCODONE BITARTRATE AND ACETAMINOPHEN 1 TABLET: 5; 325 TABLET ORAL at 04:03

## 2017-03-11 NOTE — PROGRESS NOTES
Progress Note  Thoracic Surgery    Admit Date: 3/8/2017  Attending: Suni  S/P: Procedure(s) (LRB):  THORACOSCOPY-VIDEO ASSISTED (VATS) (Right)  DISSECTION-LYMPH NODE (Right)    Post-operative Day: 3 Days Post-Op    Hospital Day: 4    SUBJECTIVE:     NAEON.  Pain well controlled.  No fevers / chills.  Ambulating well.  No CP / SOB.  Igor diet.      OBJECTIVE:     Vital Signs (Most Recent)  Temp: 98 °F (36.7 °C) (03/11/17 0757)  Pulse: 83 (03/11/17 0757)  Resp: 16 (03/11/17 0757)  BP: 121/69 (03/11/17 0757)  SpO2: 95 % (03/11/17 0757)    Vital Signs Range (Last 24H):  Temp:  [97.7 °F (36.5 °C)-98.5 °F (36.9 °C)]   Pulse:  [70-97]   Resp:  [14-18]   BP: (114-130)/(65-76)   SpO2:  [93 %-100 %]     I & O (Last 24H):  Intake/Output Summary (Last 24 hours) at 03/11/17 0841  Last data filed at 03/11/17 0430   Gross per 24 hour   Intake              962 ml   Output             1930 ml   Net             -968 ml     Physical Exam:  General: well developed, well nourished, no distress  Lungs:  clear to auscultation bilaterally and normal respiratory effort; R CT in place with ss fluid  Heart: regular rate and rhythm  Abdomen: soft, non-tender non-distented; bowel sounds normal; no masses,  no organomegaly    Wound/Incision:  clean, dry, intact    Laboratory:  CBC:   Recent Labs  Lab 03/11/17  0356   WBC 5.29   RBC 2.46*   HGB 7.6*   HCT 22.8*   *   MCV 93   MCH 30.9   MCHC 33.3     CMP:   Recent Labs  Lab 03/08/17  1214   *  116*   CALCIUM 7.8*  7.8*   *  134*   K 3.9  3.9   CO2 24  24     104   BUN 11  11   CREATININE 0.7  0.7     Coagulation: No results for input(s): INR, APTT in the last 168 hours.    Invalid input(s): PT  Labs within the past 24 hours have been reviewed.    CXR - Right apical PTX remains, stable    ASSESSMENT/PLAN:     Assessment: 72 y/o female s/p VATS RUL non-anatomic resection, doing well.    Plan:   Reg diet as igor  Ambulate / OOBTC  pulm toilet  Clamp R CT; will  repeat CXR at 1000.  If stable, d/c CT and immediate post-pull CXR  If above wnl, will plan to D/C home later today    Margy Laureano MD  General Surgery, PGY-V  [phone]

## 2017-03-11 NOTE — PROCEDURES
REQUESTING PHYSICIAN:  .    DATE OF STUDY:  03/07/2017    REASON FOR STUDY:  Malignant neoplasm.    STUDY:  The patient's spirometry shows moderately severe obstructive lung   disease with an FEV1 of 1.34 at best.  There was a good response to   bronchodilators on this study.  The lung volumes show moderate restrictive   disease.  The diffusion capacity is severely decreased.    CONCLUSION:  Moderately severe obstructive lung disease, moderate restrictive   lung disease and a severe diffusion defect.  There is a good response to   bronchodilators.      CATRACHO  dd: 03/10/2017 10:45:18 (CST)  td: 03/10/2017 20:30:48 (CST)  Doc ID   #[id number]  Job ID #[id number]    CC:

## 2017-03-11 NOTE — NURSING
Discharge instructions given to pt and pt's family, all verbalized understanding. Pain med prescription given to pt. PIVs removed. Pt leaving with family in wheelchair.

## 2017-03-11 NOTE — PLAN OF CARE
Problem: Patient Care Overview  Goal: Plan of Care Review  Outcome: Ongoing (interventions implemented as appropriate)  Remained safe throughout shift. AAOx4. NSR on tele. Right chest tube to water seal. Transferring to bedside commode with assist x1. Pain managed with PRN meds. Remained free from falls or injuries with bed locked, bed in lowest position, and call bell within reach. Son at bedside.

## 2017-03-12 LAB
BLD PROD TYP BPU: NORMAL
BLD PROD TYP BPU: NORMAL
BLOOD UNIT EXPIRATION DATE: NORMAL
BLOOD UNIT EXPIRATION DATE: NORMAL
BLOOD UNIT TYPE CODE: 6200
BLOOD UNIT TYPE CODE: 6200
BLOOD UNIT TYPE: NORMAL
BLOOD UNIT TYPE: NORMAL
CODING SYSTEM: NORMAL
CODING SYSTEM: NORMAL
DISPENSE STATUS: NORMAL
DISPENSE STATUS: NORMAL
TRANS ERYTHROCYTES VOL PATIENT: NORMAL ML
TRANS ERYTHROCYTES VOL PATIENT: NORMAL ML

## 2017-03-13 ENCOUNTER — TELEPHONE (OUTPATIENT)
Dept: CARDIOTHORACIC SURGERY | Facility: CLINIC | Age: 72
End: 2017-03-13

## 2017-03-13 NOTE — TELEPHONE ENCOUNTER
Called to check on patient after hospital discharge from [facility].  Spoke with son Arden who states that pain is a minimum with only soreness reported by his mother.  Some coughing with no mucous.  Reviewed appointment date and times for 3/27 and is agreeable with time and date.  Encouraged to call for any concerns.

## 2017-03-14 NOTE — DISCHARGE SUMMARY
[facility]  Discharge Summary  Cardiothoracic Surgery      Admit Date: 3/8/2017    Discharge Date and Time: 3/11/2017  5:16 PM    Attending Physician: Suni     Discharge Physician: Margy Laureano    Reason for Admission: RUL adenocarcinoma    Procedures Performed: Procedure(s) (LRB):  THORACOSCOPY-VIDEO ASSISTED (VATS) (Right)  DISSECTION-LYMPH NODE (Right)    Hospital Course (synopsis of major diagnoses, care, treatment, and services provided during the course of the hospital stay): Pt presented to [facility] for elective Right VATS non-anatomic right upper lobectomy with Mediastinal lymph node dissection.  Pt tolerated the procedure without complication.  For more details, please refer to op note.  Post-op, pt was transferred to the floor and had a benign course.  Chest tube pulled when appropriate.  Pt able to tolerate diet without issue.  Pt able to ambulate and urinate well.  Pt stable for discharge home.     Consults: PT / OT    Significant Diagnostic Studies: n/a    Final Diagnoses:   Principal Problem: Adenocarcinoma, lung   Secondary Diagnoses:   Active Hospital Problems    Diagnosis  POA    *Adenocarcinoma, lung [C34.90]  Yes      Resolved Hospital Problems    Diagnosis Date Resolved POA   No resolved problems to display.       Discharged Condition: good    Disposition: Home or Self Care    Sternal Precautions? No    Lift Restrictions? No    Follow Up/Patient Instructions:    Medications:   Reconciled Home Medications:   Discharge Medication List as of 3/11/2017  4:41 PM      START taking these medications    Details   senna-docusate 8.6-50 mg (PERICOLACE) 8.6-50 mg per tablet Take 1 tablet by mouth 2 (two) times daily., Starting 3/11/2017, Until Discontinued, OTC         CONTINUE these medications which have CHANGED    Details   !! hydrocodone-acetaminophen 5-325mg (NORCO) 5-325 mg per tablet Take 1 tablet by mouth every 4 (four) hours as needed., Starting 3/11/2017, Until  Discontinued, Print       !! - Potential duplicate medications found. Please discuss with provider.      CONTINUE these medications which have NOT CHANGED    Details   alprazolam (XANAX) 0.5 MG tablet TK 1 T PO QID PRA, Historical Med      calcium carbonate (OS-JACKI) 600 mg (1,500 mg) Tab Take 600 mg by mouth 2 (two) times daily with meals., Until Discontinued, Historical Med      ergocalciferol (ERGOCALCIFEROL) 50,000 unit Cap Take 50,000 Units by mouth every 7 days., Until Discontinued, Historical Med      !! hydrocodone-acetaminophen 5-325mg (NORCO) 5-325 mg per tablet Take 1 tablet by mouth every 6 (six) hours as needed for Pain., Until Discontinued, Historical Med      trazodone (DESYREL) 50 MG tablet Take 50 mg by mouth every evening., Until Discontinued, Historical Med       !! - Potential duplicate medications found. Please discuss with provider.          Discharge Procedure Orders  X-Ray Chest PA And Lateral   Standing Status: Future  Standing Exp. Date: 03/11/18   Order Specific Question Answer Comments   May the Radiologist modify the order per protocol to meet the clinical needs of the patient? Yes      Diet general     Activity as tolerated     Call MD for:  persistent nausea and vomiting or diarrhea     Call MD for:  severe uncontrolled pain     Call MD for:   Order Comments: Temperature > 101F       Follow-up Information     Follow up with Cristobal Culp MD In 1 week.    Specialty:  Cardiothoracic Surgery    Contact information:    [address]  [facility] [address]  [phone]              Margy Laureano MD  General Surgery, PGY-V  [phone]

## 2017-03-15 NOTE — PT/OT/SLP DISCHARGE
Physical Therapy Discharge Summary    Christi Ridley  MRN: [id number]   Adenocarcinoma, lung     Patient Discharged from acute Physical Therapy on 3/11/2017.  Please refer to prior PT noted date on 3/10/2017 for functional status.     Assessment:   Patient was discharge unexpectedly.  Information required to complete and accurate discharge summary is unknown.  Refer to therapy initial evaluation and last progress note for initial and most recent functional status and goal achievement.  Recommendations made may be found in medical record. Patient appropriate for care in another setting.  GOALS:   Physical Therapy Goals     Not on file      Multidisciplinary Problems (Resolved)        Problem: Physical Therapy Goal    Goal Priority Disciplines Outcome Goal Variances Interventions   Physical Therapy Goal   (Resolved)     PT/OT, PT Outcome(s) achieved     Description:  Goals to be met by: 3/24/2017     Patient will increase functional independence with mobility by performin. Supine to sit with Stand-by Assistance  2. Sit to supine with Stand-by Assistance  3. Sit to stand transfer with Stand-by Assistance demonstrating correct hand placement with RW consistently   4. Gait  x 100 feet with Stand-by Assistance using Rolling Walker with O2 sats >90% during gait.               Reasons for Discontinuation of Therapy Services  Transfer to alternate level of care.      Plan:  Patient Discharged to: Home with Home Health Service.    Mariluz Landaverde, PT  3/11/2017

## 2017-03-20 ENCOUNTER — TELEPHONE (OUTPATIENT)
Dept: CARDIOTHORACIC SURGERY | Facility: CLINIC | Age: 72
End: 2017-03-20

## 2017-03-20 NOTE — TELEPHONE ENCOUNTER
Dr. Culp spoke with Dr. Rodriges in regards to pt's pathology. Faxed office note, operative report, d/c summary, and pathology report to Dr. Rodriges at fax # [phone]. Called her office at phone # [phone] to confirm receipt of fax.

## 2017-03-21 NOTE — PROCEDURES
DATE OF STUDY:  03/07/2017    REQUESTING PHYSICIAN:  Cristobal Culp M.D.    DIAGNOSIS:  Malignant neoplasm.    STUDY:  The patient's spirometry shows moderately severe obstructive lung   disease.  There is a good response to bronchodilators.  Lung volumes show   moderate restriction.  The diffusion capacity is severely decreased, albuterol   was given with good effort.    CONCLUSION:  This patient has moderately severe obstructive lung disease with   moderate restriction and a severe diffusion defect.  The patient does respond to   bronchodilators.      CATRACHO  dd: 03/20/2017 16:46:09 (CDT)  td: 03/20/2017 21:26:11 (CDT)  Doc ID   #[id number]  Job ID #[id number]    CC:

## 2017-03-27 ENCOUNTER — OFFICE VISIT (OUTPATIENT)
Dept: CARDIOTHORACIC SURGERY | Facility: CLINIC | Age: 72
End: 2017-03-27
Payer: MEDICARE

## 2017-03-27 ENCOUNTER — HOSPITAL ENCOUNTER (OUTPATIENT)
Dept: RADIOLOGY | Facility: HOSPITAL | Age: 72
Discharge: HOME | End: 2017-03-27
Attending: SURGERY
Payer: MEDICARE

## 2017-03-27 VITALS
WEIGHT: 121 LBS | OXYGEN SATURATION: 98 % | HEART RATE: 73 BPM | DIASTOLIC BLOOD PRESSURE: 80 MMHG | HEIGHT: 63 IN | BODY MASS INDEX: 21.44 KG/M2 | SYSTOLIC BLOOD PRESSURE: 121 MMHG

## 2017-03-27 DIAGNOSIS — C34.11 MALIGNANT NEOPLASM OF UPPER LOBE OF RIGHT LUNG: ICD-10-CM

## 2017-03-27 DIAGNOSIS — D49.1 NEOPLASM OF UPPER LOBE OF RIGHT LUNG: Primary | ICD-10-CM

## 2017-03-27 PROCEDURE — 99999 PR PBB SHADOW E&M-EST. PATIENT-LVL III: CPT | Mod: PBBFAC,,, | Performed by: THORACIC SURGERY (CARDIOTHORACIC VASCULAR SURGERY)

## 2017-03-27 PROCEDURE — 99213 OFFICE O/P EST LOW 20 MIN: CPT | Mod: PBBFAC | Performed by: THORACIC SURGERY (CARDIOTHORACIC VASCULAR SURGERY)

## 2017-03-27 PROCEDURE — 99024 POSTOP FOLLOW-UP VISIT: CPT | Mod: ,,, | Performed by: THORACIC SURGERY (CARDIOTHORACIC VASCULAR SURGERY)

## 2017-03-27 PROCEDURE — 71020 XR CHEST PA AND LATERAL: CPT | Mod: 26,,, | Performed by: RADIOLOGY

## 2017-03-27 RX ORDER — HYDROCODONE BITARTRATE AND ACETAMINOPHEN 5; 325 MG/1; MG/1
1 TABLET ORAL EVERY 6 HOURS PRN
Qty: 30 TABLET | Refills: 0 | Status: SHIPPED | OUTPATIENT
Start: 2017-03-27

## 2017-03-27 RX ORDER — HYDROCODONE BITARTRATE AND ACETAMINOPHEN 10; 325 MG/1; MG/1
TABLET ORAL
Refills: 0 | COMMUNITY
Start: 2017-02-07 | End: 2017-03-27 | Stop reason: SDUPTHER

## 2017-03-27 NOTE — MR AVS SNAPSHOT
[facility]  [address]  [facility] [address]  Phone: [phone]  Fax: [phone]                  Christi Ridley   3/27/2017 2:15 PM   Office Visit    Description:  Female : 1945   Provider:  Cristobal Culp MD   Department:  [facility]           Reason for Visit     Post-op Evaluation                To Do List           Future Appointments        Provider Department Dept Phone    3/27/2017 2:15 PM Cristobal Culp MD [facility] [phone]      Goals (5 Years of Data)     None      Follow-Up and Disposition     Return in about 6 months (around 2017) for cct.      [facility] On Call     [facility] On Call Nurse Care Line -  Assistance  Registered nurses in the [facility] On Call Center provide clinical advisement, health education, appointment booking, and other advisory services.  Call for this free service at [phone].             Medications           Message regarding Medications     Verify the changes and/or additions to your medication regime listed below are the same as discussed with your clinician today.  If any of these changes or additions are incorrect, please notify your healthcare provider.             Verify that the below list of medications is an accurate representation of the medications you are currently taking.  If none reported, the list may be blank. If incorrect, please contact your healthcare provider. Carry this list with you in case of emergency.           Current Medications     alprazolam (XANAX) 0.5 MG tablet TK 1 T PO QID PRA    calcium carbonate (OS-JACKI) 600 mg (1,500 mg) Tab Take 600 mg by mouth 2 (two) times daily with meals.    ergocalciferol (ERGOCALCIFEROL) 50,000 unit Cap Take 50,000 Units by mouth every 7 days.    hydrocodone-acetaminophen 10-325mg (NORCO)  mg Tab TK 1 T PO BID FOR 30 DAYS    hydrocodone-acetaminophen 5-325mg (NORCO) 5-325 mg per tablet Take 1 tablet by mouth every  "6 (six) hours as needed for Pain.    senna-docusate 8.6-50 mg (PERICOLACE) 8.6-50 mg per tablet Take 1 tablet by mouth 2 (two) times daily.    trazodone (DESYREL) 50 MG tablet Take 50 mg by mouth every evening.           Clinical Reference Information           Your Vitals Were     BP Pulse Height Weight Last Period SpO2    121/80 73 5' 3" (1.6 m) 54.9 kg (121 lb) (LMP Unknown) 98%    BMI                21.43 kg/m2          Blood Pressure          Most Recent Value    BP  121/80      Allergies as of 3/27/2017     No Known Allergies      Immunizations Administered on Date of Encounter - 3/27/2017     None      [facility] Sign-Up     Activating your [facility] account is as easy as 1-2-3!     1) Visit [email or web address], select Sign Up Now, enter this activation code and your date of birth, then select Next.  [email or web address]  Expires: 4/16/2017  2:39 PM      2) Create a username and password to use when you visit [facility] in the future and select a security question in case you lose your password and select Next.    3) Enter your e-mail address and click Sign Up!    Additional Information  If you have questions, please e-mail [email or web address] or call [phone] to talk to our [facility] staff. Remember, [facility] is NOT to be used for urgent needs. For medical emergencies, dial 911.         Language Assistance Services     ATTENTION: Language assistance services are available, free of charge. Please call [phone].      ATENCIÓN: Si habla español, tiene a azul disposición servicios gratuitos de asistencia lingüística. Llame al [phone].     CHÚ Ý: N?u b?n nói Ti?ng Vi?t, có các d?ch v? h? tr? ngôn ng? mi?n phí dành cho b?n. G?i s? [phone].         [facility] complies with applicable Federal civil rights laws and does not discriminate on the basis of race, color, national origin, age, disability, or sex.        "

## 2017-03-27 NOTE — PROGRESS NOTES
"Subjective:       Patient ID: Christi Ridley is a 71 y.o. female.    Chief Complaint: Post-op Evaluation    Diagnosis: NSCLC, RUL    Pre-operative therapy: None    Procedure(s) and date(s): 3/8/2017: Right VATS Apical/Posterior segmentectomy, MLND    Pathology: 3.8cm moderately differentiated adenocarcinoma, +VPI, 1.2cm staple line margin; Levels 2R, 4R = negative.  T2aN0    Post-operative therapy: TBD (will discuss with Dr. Aimee Rodriges, Medical Oncology, [place], [place])    HPI: 72 yo female almost 3 weeks out from right thoracoscopic segmentectomy/subtotal lobectomy for adenocarcinoma.  The decision was made pre-operatively to proceed with sublobar resection due to poor pulmonary function.  She is doing well from a respiratory standpoint.  The majority of her complaints stem from her RA.  She denies fever, chills, cough, chest pain, dyspnea, SOB.    HPI  Review of Systems   Constitutional: Positive for fatigue. Negative for chills, diaphoresis and fever.   Respiratory: Negative for cough, choking, chest tightness, shortness of breath, wheezing and stridor.    Cardiovascular: Negative for chest pain and palpitations.   Gastrointestinal: Negative for abdominal distention, abdominal pain, nausea and vomiting.       Objective:       Vitals:    03/27/17 1352   BP: 121/80   Pulse: 73   SpO2: 98%   Weight: 54.9 kg (121 lb)   Height: 5' 3" (1.6 m)   PainSc:   4     Physical Exam   Cardiovascular: Normal rate and regular rhythm.    Pulmonary/Chest: Effort normal and breath sounds normal. No respiratory distress. She has no wheezes. She has no rales.   Incisions healing well.  Chest tube suture removed today.       CXR: Tiny effusion and apical pneumothorax, normal post-op findings    Path:  Specimen - Lung, right upper lobe  Procedure - Wedge resection  Specimen integrity - Intact  Specimen laterality - Right  Tumor site - Right upper lobe  Tumor size - 3.8 x 2.7 x 1.4cm  Tumor focality - Unifocal  Histologic type - " Adenocarcinoma  Histologic grade - Moderately differentiated  Visceral pleural invasion - Present  Tumor extension - Not applicable (tumor limited to lung)  Margins - Negative; tumor is 1.2cm from the stapled resection margin  Treatment effect - Not applicable  Lymphovascular invasion - Not identified  Pathologic staging - pT2a N0 Mx    Assessment:       1. Neoplasm of upper lobe of right lung        Plan:       Doing well.  Path results sent to her home medical oncologist, with whom she will be visiting soon.  RTC 6 months with chest CT

## 2017-04-24 ENCOUNTER — TELEPHONE (OUTPATIENT)
Dept: CARDIOTHORACIC SURGERY | Facility: CLINIC | Age: 72
End: 2017-04-24

## 2017-04-24 NOTE — TELEPHONE ENCOUNTER
Received a call from son Arden requesting records of previous hospitalization, path report, ct report and op note be mailed to parents home to begin application for insurance benefits.  Information mailed to listed home address, [address] [place] [address].

## 2017-06-13 ENCOUNTER — DOCUMENTATION ONLY (OUTPATIENT)
Dept: CARDIOTHORACIC SURGERY | Facility: CLINIC | Age: 72
End: 2017-06-13

## 2017-06-26 ENCOUNTER — TELEPHONE (OUTPATIENT)
Dept: CARDIOTHORACIC SURGERY | Facility: CLINIC | Age: 72
End: 2017-06-26

## 2020-05-21 NOTE — TELEPHONE ENCOUNTER
HR=75 bpm, TREG=452/59 mmhg, SpO2=99.0 %, Resp=15 B/min, EtCO2=30 mmHg, Apnea=0 Seconds, Pain=0, Maya=2, Comment=sr Returned call left on 6/23, message left @[phone].

## 2024-01-21 NOTE — PROGRESS NOTES
THORACIC SURGERY CLINIC NOTE    Christi Ridley is a 71 y.o.  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 Pt recently admitted at an OSH following a fall that resulting in a right femur fracture fracture. During her evaluation, she was found to have a right apical nodule which was found to be adenocarcinoma following percutaneous biopsy. Pt was discharged from SNF yesterday and is at home with her son and . Prior to femur fracture, pt was active and was able to walk over 2 blocks without SOB. No previous chest surgeries. PMH as above. Is not on home O2      ROS: Denies SOB, chest pain, abdominal pain, dyspnea on exertion, fever, chills, nausea and vomiting.      PMH:   RA  Latent TB (treated)  Benign essential HTN  COPD  HLD  Vitamin D deficiency     PSH:   Hysterectomy   Right femur intramedullary nailing    Social History     Social History    Marital status:      Spouse name: N/A    Number of children: N/A    Years of education: N/A     Occupational History    Not on file.     Social History Main Topics    Smoking status: Not on file    Smokeless tobacco: Not on file    Alcohol use Not on file    Drug use: Not on file    Sexual activity: Not on file     Other Topics Concern    Not on file     Social History Narrative       Review of patient's allergies indicates:  No Known Allergies      PHYSICAL EXAM:  Vitals:    02/02/17 0855   BP: 97/67   Pulse: 95       General: NAD, in wheelchair  Neuro: AAOx3  Cardio: S1 and S2, RRR  Resp: CTAB, breathing even and unlabored  Abd: Soft, ND, NT, no palpable mass, BS+  Ext: Warm and well perfused      PERTINENT LABS:  Reviewed. None.      PERTINENT IMAGING:  Reviewed. Chest CT and PET.      ASSESSMENT/PLAN:  71 y.o. 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    -Will need dobutamine stress test prior to an  operation through primary care physician.    -Do to recent femur surgery, would like for 2-3 more weeks of rehab/healing prior to surgery.  -Will see patient back in 2 to 3 weeks to reassess and discuss treatment going forward      Otilio Brand M.D.  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  Predicted post-op FEV1 and DLCO would both be >40%pred.  Needs DSE, which she will have ordered by her PCP and performed in [place].  We will receive results and see her back in 2-3 weeks to assess her rehabilitation status after recent hip fracture to decide whether to pursue lobectomy or SBRT   The patient is a 1y4m Female complaining of seizures.

## (undated) DEVICE — SYR 30CC LUER LOCK

## (undated) DEVICE — TAPE MEDIPORE 4IN X 2YDS

## (undated) DEVICE — SOL CLEARIFY VISUALIZATION LAP

## (undated) DEVICE — RELOAD ENDO GIA TRISTAPLE 45MM

## (undated) DEVICE — GAUZE SPONGE 4X4 12PLY

## (undated) DEVICE — ENDO GIA UNIVERSAL 12MM

## (undated) DEVICE — SEE MEDLINE ITEM 146417

## (undated) DEVICE — SUT ETHILON 2-0 BLK PS-2

## (undated) DEVICE — BLADE ELECTRO EDGE INSULATED

## (undated) DEVICE — STAPLER ENDO GIA 60MM MED THCK

## (undated) DEVICE — APPLICATOR PROGEL EXT TIP 16CM

## (undated) DEVICE — SUT CTD VICRYL 0 UND BR CT

## (undated) DEVICE — DRAPE STERI INSTRUMENT 1018

## (undated) DEVICE — DRAIN CHEST DRY SUCTION

## (undated) DEVICE — ELECTRODE REM PLYHSV RETURN 9

## (undated) DEVICE — DRESSING ADH ISLAND 3.6 X 14

## (undated) DEVICE — SYR ONLY LUER LOCK 20CC

## (undated) DEVICE — NDL 18GA X1 1/2 REG BEVEL

## (undated) DEVICE — SUT 2-0 VICRYL / CT-1

## (undated) DEVICE — STAPLE TRI-STAPLE SUL 60MM 2.0

## (undated) DEVICE — TRAY MINOR GEN SURG

## (undated) DEVICE — GAUZE SPONGE 4'X4 12 PLY

## (undated) DEVICE — DRAPE INCISE IOBAN 2 23X17IN

## (undated) DEVICE — ADHESIVE MASTISOL VIAL 48/BX

## (undated) DEVICE — DRAPE ABDOMINAL TIBURON 14X11

## (undated) DEVICE — SEE MEDLINE ITEM 146420

## (undated) DEVICE — TAPE SILK 3IN

## (undated) DEVICE — KIT ANTIFOG

## (undated) DEVICE — ELECTRODE BLADE INSULATED 1 IN

## (undated) DEVICE — PROGEL

## (undated) DEVICE — DRESSING TELFA STRL 4X3 LF

## (undated) DEVICE — CONTAINER SPECIMEN STRL 4OZ

## (undated) DEVICE — CLIP LIGATION MEDIUM CLIPS 1

## (undated) DEVICE — PLEDGET SUT SOFT 3/8X3/16X1/16

## (undated) DEVICE — SEE MEDLINE ITEM 152622

## (undated) DEVICE — SPONGE TONSIL LARGE

## (undated) DEVICE — SEE MEDLINE ITEM 157117

## (undated) DEVICE — CLOSURE SKIN STERI STRIP 1/2X4

## (undated) DEVICE — CATH THORACIC 24FR ST

## (undated) DEVICE — HEMOSTAT SURGICEL 2X3IN

## (undated) DEVICE — Device

## (undated) DEVICE — DRESSING TEGADERM CHG 4X4.5